# Patient Record
Sex: FEMALE | Race: WHITE | NOT HISPANIC OR LATINO | Employment: FULL TIME | URBAN - METROPOLITAN AREA
[De-identification: names, ages, dates, MRNs, and addresses within clinical notes are randomized per-mention and may not be internally consistent; named-entity substitution may affect disease eponyms.]

---

## 2017-06-14 ENCOUNTER — HISTORICAL (OUTPATIENT)
Dept: ADMINISTRATIVE | Facility: HOSPITAL | Age: 42
End: 2017-06-14

## 2019-11-08 ENCOUNTER — HISTORICAL (OUTPATIENT)
Dept: LAB | Facility: HOSPITAL | Age: 44
End: 2019-11-08

## 2019-11-08 LAB
ABS NEUT (OLG): 2.39 X10(3)/MCL (ref 2.1–9.2)
ALBUMIN SERPL-MCNC: 4.1 GM/DL (ref 3.4–5)
ALBUMIN/GLOB SERPL: 1 RATIO (ref 1–2)
ALP SERPL-CCNC: 56 UNIT/L (ref 45–117)
ALT SERPL-CCNC: 27 UNIT/L (ref 13–56)
AST SERPL-CCNC: 23 UNIT/L (ref 15–37)
BASOPHILS # BLD AUTO: 0.05 X10(3)/MCL (ref 0–0.2)
BASOPHILS NFR BLD AUTO: 0.9 % (ref 0–1)
BILIRUB SERPL-MCNC: 0.4 MG/DL (ref 0.2–1)
BILIRUBIN DIRECT+TOT PNL SERPL-MCNC: 0.12 MG/DL (ref 0–0.2)
BILIRUBIN DIRECT+TOT PNL SERPL-MCNC: 0.28 MG/DL (ref 0–1)
BUN SERPL-MCNC: 15 MG/DL (ref 7–18)
CALCIUM SERPL-MCNC: 9 MG/DL (ref 8.5–10.1)
CHLORIDE SERPL-SCNC: 105 MMOL/L (ref 98–107)
CHOLEST SERPL-MCNC: 224 MG/DL (ref 0–199)
CHOLEST/HDLC SERPL: 3 {RATIO}
CO2 SERPL-SCNC: 30 MMOL/L (ref 21–32)
CREAT SERPL-MCNC: 0.63 MG/DL (ref 0.55–1.02)
EOSINOPHIL # BLD AUTO: 0.18 X10(3)/MCL (ref 0–0.9)
EOSINOPHIL NFR BLD AUTO: 3.2 % (ref 0–6.4)
ERYTHROCYTE [DISTWIDTH] IN BLOOD BY AUTOMATED COUNT: 12.7 % (ref 11.5–17)
GLOBULIN SER-MCNC: 4 GM/DL (ref 2–4)
GLUCOSE SERPL-MCNC: 88 MG/DL (ref 74–106)
HCT VFR BLD AUTO: 39.1 % (ref 37–47)
HDLC SERPL-MCNC: 66 MG/DL
HGB BLD-MCNC: 13.3 GM/DL (ref 12–16)
IMM GRANULOCYTES # BLD AUTO: 0.01 10*3/UL (ref 0–0.02)
IMM GRANULOCYTES NFR BLD AUTO: 0.2 % (ref 0–0.43)
LDLC SERPL CALC-MCNC: 144 MG/DL (ref 0–129)
LYMPHOCYTES # BLD AUTO: 2.43 X10(3)/MCL (ref 0.6–4.6)
LYMPHOCYTES NFR BLD AUTO: 42.6 % (ref 16–44)
MCH RBC QN AUTO: 33.1 PG (ref 27–31)
MCHC RBC AUTO-ENTMCNC: 34 GM/DL (ref 33–36)
MCV RBC AUTO: 97.3 FL (ref 80–94)
MONOCYTES # BLD AUTO: 0.65 X10(3)/MCL (ref 0.1–1.3)
MONOCYTES NFR BLD AUTO: 11.4 % (ref 4–12.1)
NEUTROPHILS # BLD AUTO: 2.39 X10(3)/MCL (ref 2.1–9.2)
NEUTROPHILS NFR BLD AUTO: 41.7 % (ref 43–73)
NRBC BLD AUTO-RTO: 0 % (ref 0–0.2)
PLATELET # BLD AUTO: 207 X10(3)/MCL (ref 130–400)
PMV BLD AUTO: 11.2 FL (ref 7.4–10.4)
POTASSIUM SERPL-SCNC: 4 MMOL/L (ref 3.5–5.1)
PROT SERPL-MCNC: 7.9 GM/DL (ref 6.4–8.2)
RBC # BLD AUTO: 4.02 X10(6)/MCL (ref 4.2–5.4)
SODIUM SERPL-SCNC: 138 MMOL/L (ref 136–145)
TRIGL SERPL-MCNC: 68 MG/DL (ref 0–149)
VLDLC SERPL CALC-MCNC: 14 MG/DL
WBC # SPEC AUTO: 5.7 X10(3)/MCL (ref 4.5–11.5)

## 2020-08-03 ENCOUNTER — NURSE TRIAGE (OUTPATIENT)
Dept: ADMINISTRATIVE | Facility: CLINIC | Age: 45
End: 2020-08-03

## 2020-08-03 NOTE — TELEPHONE ENCOUNTER
Pt is en route to get rapid testing done elsewhere. Pt asked about accuracy of rapid testing. RN explained she did not have the information regarding accuracy of testing and pt should check with the provider of the test regarding this. Pt has no further questions or concerns. Advised to call back if she does.    Reason for Disposition   General information question, no triage required and triager able to answer question    Protocols used: INFORMATION ONLY CALL - NO TRIAGE-A-    Care Advice Given     Given By Given At Modified    Harpal Kendall RN 8/3/2020  8:43 AM No    HOME CARE - INFORMATION OR ADVICE ONLY CALL.    Harpal Kendall RN 8/3/2020  8:43 AM No    CALL BACK IF:    * You have more questions.    Harpal Kendall RN 8/3/2020  8:43 AM No    CARE ADVICE given per Information Only Call - No Triage (Adult) guideline.      Disposition     Information or Advice Only Call      Protocols (Most recently selected listed first)     Name Status    INFORMATION ONLY CALL - NO TRIAGE-A- Used    CORONAVIRUS (COVID-19) DIAGNOSED OR [id number] Viewed

## 2020-11-18 ENCOUNTER — HISTORICAL (OUTPATIENT)
Dept: LAB | Facility: HOSPITAL | Age: 45
End: 2020-11-18

## 2020-11-18 LAB
ABS NEUT (OLG): 3.21 X10(3)/MCL (ref 2.1–9.2)
ALBUMIN SERPL-MCNC: 4.1 GM/DL (ref 3.5–5)
ALBUMIN/GLOB SERPL: 1.2 RATIO (ref 1.1–2)
ALP SERPL-CCNC: 48 UNIT/L (ref 40–150)
ALT SERPL-CCNC: 18 UNIT/L (ref 0–55)
APPEARANCE, UA: ABNORMAL
AST SERPL-CCNC: 27 UNIT/L (ref 5–34)
BACTERIA SPEC CULT: ABNORMAL /HPF
BASOPHILS # BLD AUTO: 0.05 X10(3)/MCL (ref 0–0.2)
BASOPHILS NFR BLD AUTO: 0.8 % (ref 0–1)
BILIRUB SERPL-MCNC: 0.6 MG/DL (ref 0.2–1.2)
BILIRUB UR QL STRIP: NEGATIVE
BILIRUBIN DIRECT+TOT PNL SERPL-MCNC: 0.2 MG/DL (ref 0–0.5)
BILIRUBIN DIRECT+TOT PNL SERPL-MCNC: 0.4 MG/DL (ref 0–0.8)
BUN SERPL-MCNC: 11.1 MG/DL (ref 7–18.7)
CALCIUM SERPL-MCNC: 9.6 MG/DL (ref 8.4–10.2)
CHLORIDE SERPL-SCNC: 103 MMOL/L (ref 98–107)
CHOLEST SERPL-MCNC: 219 MG/DL
CHOLEST/HDLC SERPL: 3 {RATIO} (ref 0–5)
CO2 SERPL-SCNC: 28 MMOL/L (ref 22–29)
COLOR UR: ABNORMAL
CREAT SERPL-MCNC: 0.71 MG/DL (ref 0.57–1.11)
EOSINOPHIL # BLD AUTO: 0.23 X10(3)/MCL (ref 0–0.9)
EOSINOPHIL NFR BLD AUTO: 3.5 % (ref 0–6.4)
ERYTHROCYTE [DISTWIDTH] IN BLOOD BY AUTOMATED COUNT: 12.8 % (ref 11.5–17)
GLOBULIN SER-MCNC: 3.3 GM/DL (ref 2.4–3.5)
GLUCOSE (UA): NEGATIVE
GLUCOSE SERPL-MCNC: 94 MG/DL (ref 74–100)
HCT VFR BLD AUTO: 37.9 % (ref 37–47)
HDLC SERPL-MCNC: 66 MG/DL (ref 40–60)
HGB BLD-MCNC: 12.7 GM/DL (ref 12–16)
HGB UR QL STRIP: ABNORMAL
IMM GRANULOCYTES # BLD AUTO: 0.01 10*3/UL (ref 0–0.02)
IMM GRANULOCYTES NFR BLD AUTO: 0.2 % (ref 0–0.43)
KETONES UR QL STRIP: NEGATIVE
LDLC SERPL CALC-MCNC: 141 MG/DL (ref 50–140)
LEUKOCYTE ESTERASE UR QL STRIP: ABNORMAL
LYMPHOCYTES # BLD AUTO: 2.42 X10(3)/MCL (ref 0.6–4.6)
LYMPHOCYTES NFR BLD AUTO: 36.6 % (ref 16–44)
MCH RBC QN AUTO: 32 PG (ref 27–31)
MCHC RBC AUTO-ENTMCNC: 33.5 GM/DL (ref 33–36)
MCV RBC AUTO: 95.5 FL (ref 80–94)
MONOCYTES # BLD AUTO: 0.69 X10(3)/MCL (ref 0.1–1.3)
MONOCYTES NFR BLD AUTO: 10.4 % (ref 4–12.1)
NEUTROPHILS # BLD AUTO: 3.21 X10(3)/MCL (ref 2.1–9.2)
NEUTROPHILS NFR BLD AUTO: 48.5 % (ref 43–73)
NITRITE UR QL STRIP: NEGATIVE
NRBC BLD AUTO-RTO: 0 % (ref 0–0.2)
PH UR STRIP: 6 [PH] (ref 5–7)
PLATELET # BLD AUTO: 236 X10(3)/MCL (ref 130–400)
PMV BLD AUTO: 10.9 FL (ref 7.4–10.4)
POTASSIUM SERPL-SCNC: 4.6 MMOL/L (ref 3.5–5.1)
PROT SERPL-MCNC: 7.4 GM/DL (ref 6.4–8.3)
PROT UR QL STRIP: NEGATIVE
RBC # BLD AUTO: 3.97 X10(6)/MCL (ref 4.2–5.4)
RBC #/AREA URNS HPF: ABNORMAL /HPF
SODIUM SERPL-SCNC: 139 MMOL/L (ref 136–145)
SP GR UR STRIP: 1.01 (ref 1–1.03)
SQUAMOUS EPITHELIAL, UA: ABNORMAL /LPF
TRIGL SERPL-MCNC: 61 MG/DL (ref 0–150)
UROBILINOGEN UR STRIP-ACNC: NEGATIVE
VLDLC SERPL CALC-MCNC: 12 MG/DL
WBC # SPEC AUTO: 6.6 X10(3)/MCL (ref 4.5–11.5)
WBC #/AREA URNS HPF: ABNORMAL /HPF

## 2020-11-20 LAB — FINAL CULTURE: NORMAL

## 2021-10-04 ENCOUNTER — HISTORICAL (OUTPATIENT)
Dept: LAB | Facility: HOSPITAL | Age: 46
End: 2021-10-04

## 2021-10-04 LAB
ABS NEUT (OLG): 4.05 X10(3)/MCL (ref 2.1–9.2)
ANTINUCLEAR ANTIBODY SCREEN (OHS): NEGATIVE
BUN SERPL-MCNC: 12.5 MG/DL (ref 7–18.7)
CALCIUM SERPL-MCNC: 10.3 MG/DL (ref 8.4–10.2)
CENTROMERE PROTEIN ANTIBODY (OHS): NEGATIVE
CHLORIDE SERPL-SCNC: 101 MMOL/L (ref 98–107)
CO2 SERPL-SCNC: 27 MMOL/L (ref 22–29)
CREAT SERPL-MCNC: 0.73 MG/DL (ref 0.57–1.11)
CREAT/UREA NIT SERPL: 17
CRP SERPL HS-MCNC: 1 MG/L (ref 0–5)
DSDNA ANTIBODY (OHS): NEGATIVE
ERYTHROCYTE [DISTWIDTH] IN BLOOD BY AUTOMATED COUNT: 12.7 % (ref 11.5–17)
ERYTHROCYTE [SEDIMENTATION RATE] IN BLOOD: 8 MM/HR (ref 0–20)
GLUCOSE SERPL-MCNC: 91 MG/DL (ref 74–100)
HCT VFR BLD AUTO: 40.8 % (ref 37–47)
HGB BLD-MCNC: 13.5 GM/DL (ref 12–16)
JO-1 ANTIBODY (OHS): NEGATIVE
MCH RBC QN AUTO: 31.8 PG (ref 27–31)
MCHC RBC AUTO-ENTMCNC: 33.1 GM/DL (ref 33–36)
MCV RBC AUTO: 96.2 FL (ref 80–94)
NRBC BLD AUTO-RTO: 0 % (ref 0–0.2)
PLATELET # BLD AUTO: 246 X10(3)/MCL (ref 130–400)
PMV BLD AUTO: 11.6 FL (ref 7.4–10.4)
POTASSIUM SERPL-SCNC: 3.5 MMOL/L (ref 3.5–5.1)
RBC # BLD AUTO: 4.24 X10(6)/MCL (ref 4.2–5.4)
RHEUMATOID FACT SERPL-ACNC: <13 IU/ML
RNP70 ANTIBODY (OHS): NEGATIVE
SCLERODERMA (SCL-70S) ANTIBODY (OHS): NEGATIVE
SMITH DP IGG (OHS): NORMAL
SODIUM SERPL-SCNC: 138 MMOL/L (ref 136–145)
SSA(RO) ANTIBODY (OHS): NEGATIVE
SSB(LA) ANTIBODY (OHS): NEGATIVE
U1RNP ANTIBODY (OHS): NEGATIVE
WBC # SPEC AUTO: 7.4 X10(3)/MCL (ref 4.5–11.5)

## 2021-11-29 ENCOUNTER — HISTORICAL (OUTPATIENT)
Dept: LAB | Facility: HOSPITAL | Age: 46
End: 2021-11-29

## 2021-11-29 LAB
ABS NEUT (OLG): 2.13 X10(3)/MCL (ref 2.1–9.2)
ALBUMIN SERPL-MCNC: 4 GM/DL (ref 3.5–5)
ALBUMIN/GLOB SERPL: 1.3 RATIO (ref 1.1–2)
ALP SERPL-CCNC: 71 UNIT/L (ref 40–150)
ALT SERPL-CCNC: 15 UNIT/L (ref 0–55)
AST SERPL-CCNC: 27 UNIT/L (ref 5–34)
BASOPHILS # BLD AUTO: 0.07 X10(3)/MCL (ref 0–0.2)
BASOPHILS NFR BLD AUTO: 1.3 % (ref 0–1)
BILIRUB SERPL-MCNC: 0.7 MG/DL (ref 0.2–1.2)
BILIRUBIN DIRECT+TOT PNL SERPL-MCNC: 0.2 MG/DL (ref 0–0.5)
BILIRUBIN DIRECT+TOT PNL SERPL-MCNC: 0.5 MG/DL (ref 0–0.8)
BUN SERPL-MCNC: 11.9 MG/DL (ref 7–18.7)
CALCIUM SERPL-MCNC: 9.4 MG/DL (ref 8.4–10.2)
CHLORIDE SERPL-SCNC: 104 MMOL/L (ref 98–107)
CHOLEST SERPL-MCNC: 214 MG/DL
CHOLEST/HDLC SERPL: 3 {RATIO} (ref 0–5)
CO2 SERPL-SCNC: 28 MMOL/L (ref 22–29)
CREAT SERPL-MCNC: 0.68 MG/DL (ref 0.57–1.11)
EOSINOPHIL # BLD AUTO: 0.29 X10(3)/MCL (ref 0–0.9)
EOSINOPHIL NFR BLD AUTO: 5.3 % (ref 0–6.4)
ERYTHROCYTE [DISTWIDTH] IN BLOOD BY AUTOMATED COUNT: 13.1 % (ref 11.5–17)
GLOBULIN SER-MCNC: 3 GM/DL (ref 2.4–3.5)
GLUCOSE SERPL-MCNC: 101 MG/DL (ref 74–100)
HCT VFR BLD AUTO: 38.3 % (ref 37–47)
HDLC SERPL-MCNC: 66 MG/DL (ref 40–60)
HGB BLD-MCNC: 12.8 GM/DL (ref 12–16)
IMM GRANULOCYTES # BLD AUTO: 0 10*3/UL (ref 0–0.02)
IMM GRANULOCYTES NFR BLD AUTO: 0 % (ref 0–0.43)
LDLC SERPL CALC-MCNC: 134 MG/DL (ref 50–140)
LYMPHOCYTES # BLD AUTO: 2.47 X10(3)/MCL (ref 0.6–4.6)
LYMPHOCYTES NFR BLD AUTO: 44.7 % (ref 16–44)
MCH RBC QN AUTO: 32.9 PG (ref 27–31)
MCHC RBC AUTO-ENTMCNC: 33.4 GM/DL (ref 33–36)
MCV RBC AUTO: 98.5 FL (ref 80–94)
MONOCYTES # BLD AUTO: 0.56 X10(3)/MCL (ref 0.1–1.3)
MONOCYTES NFR BLD AUTO: 10.1 % (ref 4–12.1)
NEUTROPHILS # BLD AUTO: 2.13 X10(3)/MCL (ref 2.1–9.2)
NEUTROPHILS NFR BLD AUTO: 38.6 % (ref 43–73)
NRBC BLD AUTO-RTO: 0 % (ref 0–0.2)
PLATELET # BLD AUTO: 250 X10(3)/MCL (ref 130–400)
PMV BLD AUTO: 10.8 FL (ref 7.4–10.4)
POTASSIUM SERPL-SCNC: 4.1 MMOL/L (ref 3.5–5.1)
PROT SERPL-MCNC: 7 GM/DL (ref 6.4–8.3)
RBC # BLD AUTO: 3.89 X10(6)/MCL (ref 4.2–5.4)
SODIUM SERPL-SCNC: 139 MMOL/L (ref 136–145)
TRIGL SERPL-MCNC: 69 MG/DL (ref 0–150)
VLDLC SERPL CALC-MCNC: 14 MG/DL
WBC # SPEC AUTO: 5.5 X10(3)/MCL (ref 4.5–11.5)

## 2022-04-10 ENCOUNTER — HISTORICAL (OUTPATIENT)
Dept: ADMINISTRATIVE | Facility: HOSPITAL | Age: 47
End: 2022-04-10

## 2022-04-28 VITALS
OXYGEN SATURATION: 99 % | WEIGHT: 138.88 LBS | DIASTOLIC BLOOD PRESSURE: 73 MMHG | BODY MASS INDEX: 21.8 KG/M2 | HEIGHT: 67 IN | SYSTOLIC BLOOD PRESSURE: 114 MMHG

## 2022-11-16 ENCOUNTER — TELEPHONE (OUTPATIENT)
Dept: FAMILY MEDICINE | Facility: CLINIC | Age: 47
End: 2022-11-16
Payer: COMMERCIAL

## 2022-11-16 DIAGNOSIS — Z00.00 WELLNESS EXAMINATION: Primary | ICD-10-CM

## 2022-11-22 ENCOUNTER — LAB VISIT (OUTPATIENT)
Dept: LAB | Facility: HOSPITAL | Age: 47
End: 2022-11-22
Attending: NURSE PRACTITIONER
Payer: COMMERCIAL

## 2022-11-22 DIAGNOSIS — Z00.00 WELLNESS EXAMINATION: ICD-10-CM

## 2022-11-22 LAB
ALBUMIN SERPL-MCNC: 4.1 GM/DL (ref 3.5–5)
ALBUMIN/GLOB SERPL: 1.2 RATIO (ref 1.1–2)
ALP SERPL-CCNC: 70 UNIT/L (ref 40–150)
ALT SERPL-CCNC: 17 UNIT/L (ref 0–55)
AST SERPL-CCNC: 25 UNIT/L (ref 5–34)
BASOPHILS # BLD AUTO: 0.07 X10(3)/MCL (ref 0–0.2)
BASOPHILS NFR BLD AUTO: 1.5 %
BILIRUBIN DIRECT+TOT PNL SERPL-MCNC: 0.8 MG/DL
BUN SERPL-MCNC: 11.4 MG/DL (ref 7–18.7)
CALCIUM SERPL-MCNC: 10.1 MG/DL (ref 8.4–10.2)
CHLORIDE SERPL-SCNC: 104 MMOL/L (ref 98–107)
CHOLEST SERPL-MCNC: 212 MG/DL
CHOLEST/HDLC SERPL: 3 {RATIO} (ref 0–5)
CO2 SERPL-SCNC: 30 MMOL/L (ref 22–29)
CREAT SERPL-MCNC: 0.71 MG/DL (ref 0.55–1.02)
EOSINOPHIL # BLD AUTO: 0.15 X10(3)/MCL (ref 0–0.9)
EOSINOPHIL NFR BLD AUTO: 3.3 %
ERYTHROCYTE [DISTWIDTH] IN BLOOD BY AUTOMATED COUNT: 13.5 % (ref 11.5–17)
EST. AVERAGE GLUCOSE BLD GHB EST-MCNC: 99.7 MG/DL
GFR SERPLBLD CREATININE-BSD FMLA CKD-EPI: >60 MLS/MIN/1.73/M2
GLOBULIN SER-MCNC: 3.3 GM/DL (ref 2.4–3.5)
GLUCOSE SERPL-MCNC: 98 MG/DL (ref 74–100)
HBA1C MFR BLD: 5.1 %
HCT VFR BLD AUTO: 40.2 % (ref 37–47)
HCV AB SERPL QL IA: NONREACTIVE
HDLC SERPL-MCNC: 64 MG/DL (ref 35–60)
HGB BLD-MCNC: 13.4 GM/DL (ref 12–16)
HIV 1+2 AB+HIV1 P24 AG SERPL QL IA: NONREACTIVE
IMM GRANULOCYTES # BLD AUTO: 0.01 X10(3)/MCL (ref 0–0.04)
IMM GRANULOCYTES NFR BLD AUTO: 0.2 %
LDLC SERPL CALC-MCNC: 132 MG/DL (ref 50–140)
LYMPHOCYTES # BLD AUTO: 1.99 X10(3)/MCL (ref 0.6–4.6)
LYMPHOCYTES NFR BLD AUTO: 43.2 %
MCH RBC QN AUTO: 32.5 PG (ref 27–31)
MCHC RBC AUTO-ENTMCNC: 33.3 MG/DL (ref 33–36)
MCV RBC AUTO: 97.6 FL (ref 80–94)
MONOCYTES # BLD AUTO: 0.51 X10(3)/MCL (ref 0.1–1.3)
MONOCYTES NFR BLD AUTO: 11.1 %
NEUTROPHILS # BLD AUTO: 1.9 X10(3)/MCL (ref 2.1–9.2)
NEUTROPHILS NFR BLD AUTO: 40.7 %
NRBC BLD AUTO-RTO: 0 %
PLATELET # BLD AUTO: 246 X10(3)/MCL (ref 130–400)
PMV BLD AUTO: 11.2 FL (ref 7.4–10.4)
POTASSIUM SERPL-SCNC: 4.4 MMOL/L (ref 3.5–5.1)
PROT SERPL-MCNC: 7.4 GM/DL (ref 6.4–8.3)
RBC # BLD AUTO: 4.12 X10(6)/MCL (ref 4.2–5.4)
SODIUM SERPL-SCNC: 142 MMOL/L (ref 136–145)
TRIGL SERPL-MCNC: 78 MG/DL (ref 37–140)
TSH SERPL-ACNC: 0.93 UIU/ML (ref 0.35–4.94)
VLDLC SERPL CALC-MCNC: 16 MG/DL
WBC # SPEC AUTO: 4.6 X10(3)/MCL (ref 4.5–11.5)

## 2022-11-22 PROCEDURE — 80061 LIPID PANEL: CPT

## 2022-11-22 PROCEDURE — 86803 HEPATITIS C AB TEST: CPT

## 2022-11-22 PROCEDURE — 85025 COMPLETE CBC W/AUTO DIFF WBC: CPT

## 2022-11-22 PROCEDURE — 84443 ASSAY THYROID STIM HORMONE: CPT

## 2022-11-22 PROCEDURE — 80053 COMPREHEN METABOLIC PANEL: CPT

## 2022-11-22 PROCEDURE — 83036 HEMOGLOBIN GLYCOSYLATED A1C: CPT

## 2022-11-22 PROCEDURE — 87389 HIV-1 AG W/HIV-1&-2 AB AG IA: CPT

## 2022-11-22 PROCEDURE — 36415 COLL VENOUS BLD VENIPUNCTURE: CPT

## 2022-12-05 ENCOUNTER — OFFICE VISIT (OUTPATIENT)
Dept: FAMILY MEDICINE | Facility: CLINIC | Age: 47
End: 2022-12-05
Payer: COMMERCIAL

## 2022-12-05 VITALS
DIASTOLIC BLOOD PRESSURE: 68 MMHG | SYSTOLIC BLOOD PRESSURE: 98 MMHG | OXYGEN SATURATION: 98 % | HEART RATE: 58 BPM | WEIGHT: 140.5 LBS | HEIGHT: 66 IN | RESPIRATION RATE: 18 BRPM | TEMPERATURE: 99 F | BODY MASS INDEX: 22.58 KG/M2

## 2022-12-05 DIAGNOSIS — Z00.00 WELLNESS EXAMINATION: Primary | ICD-10-CM

## 2022-12-05 DIAGNOSIS — R82.71 BACTERIURIA: ICD-10-CM

## 2022-12-05 DIAGNOSIS — Z79.890 HORMONE REPLACEMENT THERAPY: ICD-10-CM

## 2022-12-05 DIAGNOSIS — Z23 NEED FOR TETANUS BOOSTER: ICD-10-CM

## 2022-12-05 DIAGNOSIS — Z12.11 COLON CANCER SCREENING: ICD-10-CM

## 2022-12-05 DIAGNOSIS — E78.00 HYPERCHOLESTEROLEMIA: ICD-10-CM

## 2022-12-05 PROCEDURE — 90471 TDAP VACCINE GREATER THAN OR EQUAL TO 7YO IM: ICD-10-PCS | Mod: ,,, | Performed by: NURSE PRACTITIONER

## 2022-12-05 PROCEDURE — 3074F SYST BP LT 130 MM HG: CPT | Mod: CPTII,,, | Performed by: NURSE PRACTITIONER

## 2022-12-05 PROCEDURE — 90715 TDAP VACCINE 7 YRS/> IM: CPT | Mod: ,,, | Performed by: NURSE PRACTITIONER

## 2022-12-05 PROCEDURE — 1159F MED LIST DOCD IN RCRD: CPT | Mod: CPTII,,, | Performed by: NURSE PRACTITIONER

## 2022-12-05 PROCEDURE — 3008F BODY MASS INDEX DOCD: CPT | Mod: CPTII,,, | Performed by: NURSE PRACTITIONER

## 2022-12-05 PROCEDURE — 3078F PR MOST RECENT DIASTOLIC BLOOD PRESSURE < 80 MM HG: ICD-10-PCS | Mod: CPTII,,, | Performed by: NURSE PRACTITIONER

## 2022-12-05 PROCEDURE — 3074F PR MOST RECENT SYSTOLIC BLOOD PRESSURE < 130 MM HG: ICD-10-PCS | Mod: CPTII,,, | Performed by: NURSE PRACTITIONER

## 2022-12-05 PROCEDURE — 1159F PR MEDICATION LIST DOCUMENTED IN MEDICAL RECORD: ICD-10-PCS | Mod: CPTII,,, | Performed by: NURSE PRACTITIONER

## 2022-12-05 PROCEDURE — 3078F DIAST BP <80 MM HG: CPT | Mod: CPTII,,, | Performed by: NURSE PRACTITIONER

## 2022-12-05 PROCEDURE — 99396 PREV VISIT EST AGE 40-64: CPT | Mod: 25,,, | Performed by: NURSE PRACTITIONER

## 2022-12-05 PROCEDURE — 3008F PR BODY MASS INDEX (BMI) DOCUMENTED: ICD-10-PCS | Mod: CPTII,,, | Performed by: NURSE PRACTITIONER

## 2022-12-05 PROCEDURE — 90715 TDAP VACCINE GREATER THAN OR EQUAL TO 7YO IM: ICD-10-PCS | Mod: ,,, | Performed by: NURSE PRACTITIONER

## 2022-12-05 PROCEDURE — 99396 PR PREVENTIVE VISIT,EST,40-64: ICD-10-PCS | Mod: 25,,, | Performed by: NURSE PRACTITIONER

## 2022-12-05 PROCEDURE — 90471 IMMUNIZATION ADMIN: CPT | Mod: ,,, | Performed by: NURSE PRACTITIONER

## 2022-12-05 RX ORDER — AZELASTINE 1 MG/ML
SPRAY, METERED NASAL
COMMUNITY
Start: 2022-09-03

## 2022-12-05 RX ORDER — ESTRADIOL AND NORETHINDRONE ACETATE 1; .5 MG/1; MG/1
1 TABLET ORAL DAILY
COMMUNITY
End: 2023-09-07 | Stop reason: ALTCHOICE

## 2022-12-05 NOTE — PROGRESS NOTES
Subjective:      Patient ID: Hazel Bee is a 47 y.o. White female      Chief Complaint: Annual Exam (With completed labs. /Would like to discuss estradiol. )       Past Medical History:   Diagnosis Date    Hypercholesterolemia 12/5/2022        HPI  Presents to clinic for Annual Wellness exam. Denies any acute problems.     Labs previously done and reviewed with patient  PAP up to date; repeat due 2024  MMG due and scheduled at [facility] 1/2023  Cologuard Ordered  Tdap due; given today in office  Covid-19 (2021); Booster due        Review of Systems   Constitutional: Negative.  Negative for appetite change, chills and fever.   HENT: Negative.     Eyes: Negative.  Negative for discharge and redness.   Respiratory: Negative.  Negative for shortness of breath.    Cardiovascular: Negative.  Negative for chest pain.   Gastrointestinal: Negative.    Endocrine: Negative.    Genitourinary: Negative.    Integumentary:  Negative.   Allergic/Immunologic: Negative.    Neurological: Negative.    Psychiatric/Behavioral: Negative.     All other systems reviewed and are negative.       Objective:      Vitals:    12/05/22 1347   BP: 98/68   Pulse: (!) 58   Resp: 18   Temp: 99.3 °F (37.4 °C)      Body mass index is 22.68 kg/m².     Physical Exam  Vitals reviewed.   Constitutional:       Appearance: Normal appearance.   HENT:      Head: Normocephalic.      Mouth/Throat:      Mouth: Mucous membranes are moist.   Eyes:      Conjunctiva/sclera: Conjunctivae normal.      Pupils: Pupils are equal, round, and reactive to light.   Cardiovascular:      Rate and Rhythm: Normal rate and regular rhythm.   Pulmonary:      Effort: Pulmonary effort is normal. No respiratory distress.      Breath sounds: Normal breath sounds.   Abdominal:      General: Bowel sounds are normal.   Musculoskeletal:         General: Normal range of motion.      Cervical back: Normal range of motion and neck supple.   Skin:     General: Skin is warm and dry.    Neurological:      Mental Status: She is alert and oriented to person, place, and time.   Psychiatric:         Mood and Affect: Mood normal.          Current Outpatient Medications:     azelastine (ASTELIN) 137 mcg (0.1 %) nasal spray, as needed., Disp: , Rfl:     estradiol-norethindrone (ACTIVELLA) 1-0.5 mg per tablet, Take 1 tablet by mouth once daily., Disp: , Rfl:     Assessment & Plan:     Problem List Items Addressed This Visit          Cardiac/Vascular    Hypercholesterolemia     The 10-year ASCVD risk score (Manoj ENRIQUEZ, et al., 2019) is: 0.5%    Values used to calculate the score:      Age: 47 years      Sex: Female      Is Non- : No      Diabetic: No      Tobacco smoker: No      Systolic Blood Pressure: 98 mmHg      Is BP treated: No      HDL Cholesterol: 64 mg/dL      Total Cholesterol: 212 mg/dL      No Statin Indicated at this time            Renal/    Bacteriuria     Asymptomatic; no treatment indicated at present            ID    Need for tetanus booster     Tdap today         Relevant Orders    (In Office Administered) Tdap Vaccine       Endocrine    Hormone replacement therapy     Stable  Continue medication            Other    Wellness examination - Primary     Labs previously done and reviewed with patient  PAP up to date; repeat due 2024  MMG due and scheduled at [facility] 1/2023  Cologuard Ordered  Tdap due; given today in office  Covid-19 (2021); Booster due              Other Visit Diagnoses       Colon cancer screening        Relevant Orders    Cologuard Screening (Multitarget Stool DNA)

## 2022-12-05 NOTE — ASSESSMENT & PLAN NOTE
The 10-year ASCVD risk score (Manoj ENRIQUEZ, et al., 2019) is: 0.5%    Values used to calculate the score:      Age: 47 years      Sex: Female      Is Non- : No      Diabetic: No      Tobacco smoker: No      Systolic Blood Pressure: 98 mmHg      Is BP treated: No      HDL Cholesterol: 64 mg/dL      Total Cholesterol: 212 mg/dL      No Statin Indicated at this time

## 2022-12-05 NOTE — ASSESSMENT & PLAN NOTE
Labs previously done and reviewed with patient  PAP up to date; repeat due 2024  MMG due and scheduled at [facility] 1/2023  Cologuard Ordered  Tdap due; given today in office  Covid-19 (2021); Booster due

## 2023-01-04 LAB — BCS RECOMMENDATION EXT: NORMAL

## 2023-01-06 LAB — NONINV COLON CA DNA+OCC BLD SCRN STL QL: NEGATIVE

## 2023-01-12 ENCOUNTER — DOCUMENTATION ONLY (OUTPATIENT)
Dept: FAMILY MEDICINE | Facility: CLINIC | Age: 48
End: 2023-01-12
Payer: COMMERCIAL

## 2023-03-06 PROBLEM — Z00.00 WELLNESS EXAMINATION: Status: RESOLVED | Noted: 2022-12-05 | Resolved: 2023-03-06

## 2023-09-07 ENCOUNTER — TELEPHONE (OUTPATIENT)
Dept: FAMILY MEDICINE | Facility: CLINIC | Age: 48
End: 2023-09-07
Payer: COMMERCIAL

## 2023-09-07 ENCOUNTER — OFFICE VISIT (OUTPATIENT)
Dept: URGENT CARE | Facility: CLINIC | Age: 48
End: 2023-09-07
Payer: COMMERCIAL

## 2023-09-07 VITALS
WEIGHT: 143 LBS | OXYGEN SATURATION: 99 % | HEIGHT: 66 IN | TEMPERATURE: 99 F | SYSTOLIC BLOOD PRESSURE: 112 MMHG | RESPIRATION RATE: 18 BRPM | DIASTOLIC BLOOD PRESSURE: 75 MMHG | BODY MASS INDEX: 22.98 KG/M2 | HEART RATE: 62 BPM

## 2023-09-07 DIAGNOSIS — N89.8 VAGINAL DISCHARGE: Primary | ICD-10-CM

## 2023-09-07 DIAGNOSIS — N89.8 VAGINAL IRRITATION: ICD-10-CM

## 2023-09-07 LAB
BILIRUB UR QL STRIP: NEGATIVE
CLUE CELLS VAG QL WET PREP: ABNORMAL
GLUCOSE UR QL STRIP: NEGATIVE
KETONES UR QL STRIP: NEGATIVE
LEUKOCYTE ESTERASE UR QL STRIP: POSITIVE
PH, POC UA: 7
POC BLOOD, URINE: POSITIVE
POC NITRATES, URINE: NEGATIVE
PROT UR QL STRIP: NEGATIVE
SP GR UR STRIP: 1.01 (ref 1–1.03)
T VAGINALIS VAG QL WET PREP: ABNORMAL
UROBILINOGEN UR STRIP-ACNC: ABNORMAL (ref 0.1–1.1)
WBC #/AREA VAG WET PREP: ABNORMAL
YEAST SPEC QL WET PREP: ABNORMAL

## 2023-09-07 PROCEDURE — 99213 PR OFFICE/OUTPT VISIT, EST, LEVL III, 20-29 MIN: ICD-10-PCS | Mod: S$PBB,,, | Performed by: STUDENT IN AN ORGANIZED HEALTH CARE EDUCATION/TRAINING PROGRAM

## 2023-09-07 PROCEDURE — 87210 SMEAR WET MOUNT SALINE/INK: CPT | Performed by: STUDENT IN AN ORGANIZED HEALTH CARE EDUCATION/TRAINING PROGRAM

## 2023-09-07 PROCEDURE — 99213 OFFICE O/P EST LOW 20 MIN: CPT | Mod: S$PBB,,, | Performed by: STUDENT IN AN ORGANIZED HEALTH CARE EDUCATION/TRAINING PROGRAM

## 2023-09-07 PROCEDURE — 99213 OFFICE O/P EST LOW 20 MIN: CPT | Mod: PBBFAC | Performed by: STUDENT IN AN ORGANIZED HEALTH CARE EDUCATION/TRAINING PROGRAM

## 2023-09-07 PROCEDURE — 81003 URINALYSIS AUTO W/O SCOPE: CPT | Mod: PBBFAC | Performed by: STUDENT IN AN ORGANIZED HEALTH CARE EDUCATION/TRAINING PROGRAM

## 2023-09-07 RX ORDER — ESTRADIOL 0.1 MG/G
CREAM VAGINAL
COMMUNITY
Start: 2023-07-09 | End: 2023-12-06 | Stop reason: ALTCHOICE

## 2023-09-07 RX ORDER — MICONAZOLE NITRATE 200 MG/1
200 SUPPOSITORY VAGINAL NIGHTLY
Qty: 3 SUPPOSITORY | Refills: 0 | Status: SHIPPED | OUTPATIENT
Start: 2023-09-07 | End: 2023-12-06 | Stop reason: ALTCHOICE

## 2023-09-07 RX ORDER — PROGESTERONE 200 MG/1
200 CAPSULE ORAL NIGHTLY
COMMUNITY
Start: 2023-07-20

## 2023-09-07 NOTE — TELEPHONE ENCOUNTER
----- Message from Guerda Clinton LPN sent at 9/7/2023  9:00 AM CDT -----  Regarding: FW: med advice  Please call pt  Help her get scheduled for visit  ----- Message -----  From: Danielle Garcia  Sent: 9/7/2023   8:42 AM CDT  To: Trevor Alexander Staff  Subject: med advice                                       .Type:  Needs Medical Advice    Who Called: Pt  Symptoms (please be specific):    How long has patient had these symptoms:    Pharmacy name and phone #: [id number] [place] - [address]  Would the patient rather a call back or a response via [facility]? Call back  Best Call Back Number: [phone]  Additional Information: Pt requesting meds for a yeast infection, pt does rather cream and not pills, refused appt.

## 2023-09-07 NOTE — PROGRESS NOTES
"Subjective:      Patient ID: Hazel Bee is a 47 y.o. female.    Vitals:  height is 5' 6" (1.676 m) and weight is 64.9 kg (143 lb). Her oral temperature is 98.6 °F (37 °C). Her blood pressure is 112/75 and her pulse is 62. Her respiration is 18 and oxygen saturation is 99%.     Chief Complaint: Vaginal Pain (Burning and discharge x 2 days. Denies urinary issues.)    Vaginal Pain  The patient's primary symptoms include vaginal discharge. Pertinent negatives include no abdominal pain, chills, constipation, diarrhea, dysuria, fever, flank pain, frequency, nausea, rash, urgency or vomiting.     Hazel Bee is a 47-year-old female presenting to the urgent care clinic today with vaginal discharge and irritation that has been going on for the past 2 days.  She denies any hematuria, dysuria, burning with urination.  Patient states that his symptoms feel similar to prior times when she has had a yeast infection.  She denies any malodorous vaginal discharge.    Constitution: Negative for chills and fever.   HENT:  Negative for ear pain, congestion, sinus pain and sinus pressure.    Neck: Negative for neck pain and neck swelling.   Cardiovascular:  Negative for chest pain and sob on exertion.   Eyes:  Negative for eye pain.   Respiratory:  Negative for shortness of breath and wheezing.    Gastrointestinal:  Negative for abdominal pain, nausea, vomiting, constipation and diarrhea.   Genitourinary:  Positive for vaginal pain and vaginal discharge. Negative for dysuria, frequency, urgency and flank pain.   Musculoskeletal:  Negative for pain.   Skin:  Negative for rash.   Neurological:  Negative for dizziness, numbness, tingling and seizures.      Objective:     Physical Exam   Constitutional: She is oriented to person, place, and time. She appears well-developed.   HENT:   Head: Normocephalic and atraumatic.   Ears:   Right Ear: External ear normal.   Left Ear: External ear normal.   Nose: Nose normal.   Mouth/Throat: Mucous " membranes are normal.   Eyes: Conjunctivae and lids are normal.   Neck: Trachea normal. Neck supple.   Cardiovascular: Normal rate, regular rhythm and normal heart sounds.   Pulmonary/Chest: Effort normal and breath sounds normal. No respiratory distress.   Abdominal: Normal appearance and bowel sounds are normal. She exhibits no distension and no mass. Soft. There is no abdominal tenderness.   Genitourinary:         Comments: Deferred     Musculoskeletal: Normal range of motion.         General: Normal range of motion.   Neurological: She is alert and oriented to person, place, and time. She has normal strength.   Skin: Skin is warm, dry, intact, not diaphoretic and not pale.   Psychiatric: Her speech is normal and behavior is normal. Judgment and thought content normal.   Nursing note and vitals reviewed.      Assessment:     1. Vaginal discharge    2. Vaginal irritation        Plan:     Patient presents to the urgent care clinic today general discharge for the past 2 days, that is very similar to prior times when she has had a yeast infection.  We will send in a prescription for fluconazole suppositories per her preference and we will send in a wet prep to confirm.  Recommended follow-up with her gynecologist.    Vaginal discharge  -     miconazole nitrate (MICONAZOLE-3) 200 mg Supp; Place 1 suppository (200 mg total) vaginally every evening.  Dispense: 3 suppository; Refill: 0    Vaginal irritation  -     POCT Urinalysis, Dipstick, Automated, W/O Scope  -     Wet Prep, Genital      This note is dictated using the M*Modal Fluency Direct word recognition program. There are word recognition mistakes that are occasionally missed on review.    Floresita Campos MD

## 2023-09-09 ENCOUNTER — TELEPHONE (OUTPATIENT)
Dept: URGENT CARE | Facility: CLINIC | Age: 48
End: 2023-09-09
Payer: COMMERCIAL

## 2023-09-09 DIAGNOSIS — B37.31 VULVOVAGINAL CANDIDIASIS: Primary | ICD-10-CM

## 2023-09-09 RX ORDER — FLUCONAZOLE 150 MG/1
TABLET ORAL
Qty: 2 TABLET | Refills: 0 | Status: SHIPPED | OUTPATIENT
Start: 2023-09-09 | End: 2023-12-06 | Stop reason: ALTCHOICE

## 2023-09-09 NOTE — TELEPHONE ENCOUNTER
----- Message from Melisa Lockhart sent at 9/9/2023  6:25 PM CDT -----  Regarding: medication  Patient was prescribed, Miconazole, for a yeast infection on 09/07.  [facility] and patient would like to know if she could be prescribed the pill version        Thank you!

## 2023-10-31 ENCOUNTER — OFFICE VISIT (OUTPATIENT)
Dept: URGENT CARE | Facility: CLINIC | Age: 48
End: 2023-10-31
Payer: COMMERCIAL

## 2023-10-31 VITALS
BODY MASS INDEX: 22.82 KG/M2 | HEIGHT: 66 IN | HEART RATE: 58 BPM | TEMPERATURE: 98 F | OXYGEN SATURATION: 99 % | DIASTOLIC BLOOD PRESSURE: 67 MMHG | SYSTOLIC BLOOD PRESSURE: 114 MMHG | RESPIRATION RATE: 16 BRPM | WEIGHT: 142 LBS

## 2023-10-31 DIAGNOSIS — R09.81 SINUS CONGESTION: ICD-10-CM

## 2023-10-31 DIAGNOSIS — R09.82 POSTNASAL DRIP: Primary | ICD-10-CM

## 2023-10-31 LAB
CTP QC/QA: YES
MOLECULAR STREP A: NEGATIVE

## 2023-10-31 PROCEDURE — 99213 PR OFFICE/OUTPT VISIT, EST, LEVL III, 20-29 MIN: ICD-10-PCS | Mod: S$PBB,,, | Performed by: STUDENT IN AN ORGANIZED HEALTH CARE EDUCATION/TRAINING PROGRAM

## 2023-10-31 PROCEDURE — 99214 OFFICE O/P EST MOD 30 MIN: CPT | Mod: PBBFAC | Performed by: STUDENT IN AN ORGANIZED HEALTH CARE EDUCATION/TRAINING PROGRAM

## 2023-10-31 PROCEDURE — 87651 STREP A DNA AMP PROBE: CPT | Mod: PBBFAC | Performed by: STUDENT IN AN ORGANIZED HEALTH CARE EDUCATION/TRAINING PROGRAM

## 2023-10-31 PROCEDURE — 99213 OFFICE O/P EST LOW 20 MIN: CPT | Mod: S$PBB,,, | Performed by: STUDENT IN AN ORGANIZED HEALTH CARE EDUCATION/TRAINING PROGRAM

## 2023-10-31 RX ORDER — FLUTICASONE PROPIONATE 50 MCG
2 SPRAY, SUSPENSION (ML) NASAL DAILY
Qty: 16 G | Refills: 0 | Status: SHIPPED | OUTPATIENT
Start: 2023-10-31

## 2023-10-31 NOTE — PROGRESS NOTES
"Subjective:      Patient ID: Hazel Bee is a 48 y.o. female.    Vitals:  height is 5' 6" (1.676 m) and weight is 64.4 kg (142 lb). Her oral temperature is 98.3 °F (36.8 °C). Her blood pressure is 114/67 and her pulse is 58 (abnormal). Her respiration is 16 and oxygen saturation is 99%.     Chief Complaint: Sinus Problem (Sinus pressure and nasal congestion x 1 month. Left ear pain x 1 week.)    Sinus Problem      Hazel Bee is a 48-year-old female presenting to the urgent care clinic today with sinus congestion, rhinorrhea, throat itching/throat pain, left ear pain.  Patient states that symptoms of nasal congestion and rhinorrhea have been going on for the past 1 month and left ear pain has been going on for the past 1 week.  Patient has not tried any nasal sprays.  She denies any fevers or chills.  Denies any headaches.  She denies any neurological symptoms.  ROS   Objective:     Physical Exam   Constitutional: She is oriented to person, place, and time. She appears well-developed. She is cooperative.  Non-toxic appearance. She does not appear ill. No distress.   HENT:   Head: Normocephalic and atraumatic.   Ears:   Right Ear: Hearing and external ear normal. impacted cerumen  Left Ear: Hearing, tympanic membrane, external ear and ear canal normal.   Nose: Congestion present. No mucosal edema, rhinorrhea or nasal deformity. No epistaxis. Right sinus exhibits no maxillary sinus tenderness and no frontal sinus tenderness. Left sinus exhibits no maxillary sinus tenderness and no frontal sinus tenderness.   Mouth/Throat: Uvula is midline, oropharynx is clear and moist and mucous membranes are normal. No trismus in the jaw. Normal dentition. No uvula swelling. No oropharyngeal exudate, posterior oropharyngeal edema or posterior oropharyngeal erythema.      Comments: Pharyngeal cobblestoning present  Eyes: Conjunctivae and lids are normal. No scleral icterus.   Neck: Trachea normal and phonation normal. Neck " supple. No edema present. No erythema present. No neck rigidity present.   Cardiovascular: Normal rate, regular rhythm, normal heart sounds and normal pulses.   Pulmonary/Chest: Effort normal and breath sounds normal. No respiratory distress. She has no decreased breath sounds. She has no rhonchi.   Abdominal: Normal appearance.   Musculoskeletal: Normal range of motion.         General: No deformity. Normal range of motion.   Neurological: She is alert and oriented to person, place, and time. She exhibits normal muscle tone. Coordination normal.   Skin: Skin is warm, dry, intact, not diaphoretic and not pale.   Psychiatric: Her speech is normal and behavior is normal. Judgment and thought content normal.   Nursing note and vitals reviewed.      Assessment:     1. Postnasal drip    2. Sinus congestion        Plan:   Patient presents to the urgent care clinic today with congestion, rhinorrhea, itchy/sore throat that has been going on for the past 1 month.  Physical exam as noted above.  We will treat patient for symptomatic postnasal drip.  Return to clinic precautions provided the patient.    Postnasal drip  -     fluticasone propionate (FLONASE) 50 mcg/actuation nasal spray; 2 sprays (100 mcg total) by Each Nostril route once daily.  Dispense: 16 g; Refill: 0    Sinus congestion  -     POCT Strep A, Molecular      This note is dictated using the M*Modal Fluency Direct word recognition program. There are word recognition mistakes that are occasionally missed on review.    Floresita Campos MD

## 2023-11-14 ENCOUNTER — OFFICE VISIT (OUTPATIENT)
Dept: URGENT CARE | Facility: CLINIC | Age: 48
End: 2023-11-14
Payer: COMMERCIAL

## 2023-11-14 VITALS
HEART RATE: 68 BPM | RESPIRATION RATE: 16 BRPM | OXYGEN SATURATION: 99 % | WEIGHT: 142 LBS | SYSTOLIC BLOOD PRESSURE: 104 MMHG | HEIGHT: 66 IN | TEMPERATURE: 99 F | BODY MASS INDEX: 22.82 KG/M2 | DIASTOLIC BLOOD PRESSURE: 70 MMHG

## 2023-11-14 DIAGNOSIS — J02.9 SORE THROAT: ICD-10-CM

## 2023-11-14 DIAGNOSIS — J02.0 STREP PHARYNGITIS: Primary | ICD-10-CM

## 2023-11-14 LAB
CTP QC/QA: YES
MOLECULAR STREP A: POSITIVE
POC MOLECULAR INFLUENZA A AGN: NEGATIVE
POC MOLECULAR INFLUENZA B AGN: NEGATIVE
SARS-COV-2 RDRP RESP QL NAA+PROBE: NEGATIVE

## 2023-11-14 PROCEDURE — 87651 POCT STREP A MOLECULAR: ICD-10-PCS | Mod: QW,,, | Performed by: FAMILY MEDICINE

## 2023-11-14 PROCEDURE — 87502 POCT INFLUENZA A/B MOLECULAR: ICD-10-PCS | Mod: QW,,, | Performed by: FAMILY MEDICINE

## 2023-11-14 PROCEDURE — 87635: ICD-10-PCS | Mod: QW,,, | Performed by: FAMILY MEDICINE

## 2023-11-14 PROCEDURE — 99214 OFFICE O/P EST MOD 30 MIN: CPT | Mod: ,,, | Performed by: FAMILY MEDICINE

## 2023-11-14 PROCEDURE — 87502 INFLUENZA DNA AMP PROBE: CPT | Mod: QW,,, | Performed by: FAMILY MEDICINE

## 2023-11-14 PROCEDURE — 99214 PR OFFICE/OUTPT VISIT, EST, LEVL IV, 30-39 MIN: ICD-10-PCS | Mod: ,,, | Performed by: FAMILY MEDICINE

## 2023-11-14 PROCEDURE — 87651 STREP A DNA AMP PROBE: CPT | Mod: QW,,, | Performed by: FAMILY MEDICINE

## 2023-11-14 PROCEDURE — 87635 SARS-COV-2 COVID-19 AMP PRB: CPT | Mod: QW,,, | Performed by: FAMILY MEDICINE

## 2023-11-14 RX ORDER — CEPHALEXIN 500 MG/1
500 CAPSULE ORAL EVERY 12 HOURS
Qty: 20 CAPSULE | Refills: 0 | Status: SHIPPED | OUTPATIENT
Start: 2023-11-14 | End: 2023-11-24

## 2023-11-14 RX ORDER — FLUCONAZOLE 150 MG/1
150 TABLET ORAL DAILY
Qty: 1 TABLET | Refills: 0 | Status: SHIPPED | OUTPATIENT
Start: 2023-11-14 | End: 2023-11-15

## 2023-11-14 NOTE — PROGRESS NOTES
"Subjective:      Patient ID: Hazel Bee is a 48 y.o. female.    Vitals:  height is 5' 6" (1.676 m) and weight is 64.4 kg (142 lb). Her temperature is 98.9 °F (37.2 °C). Her blood pressure is 104/70 and her pulse is 68. Her respiration is 16 and oxygen saturation is 99%.     Chief Complaint: Sinus Problem (X on and off since September but worsened :Sunday- sore throat, sinus drip, sinus congestion, fever- 100.8 )    2 days of cough, congestion, pharyngitis and now fever.         Constitution: Positive for fever. Negative for chills and fatigue.   HENT:  Positive for postnasal drip. Negative for congestion, sinus pressure and trouble swallowing.    Neck: Negative for neck pain and neck stiffness.   Cardiovascular:  Negative for chest pain, leg swelling and sob on exertion.   Respiratory:  Positive for cough. Negative for chest tightness, shortness of breath and wheezing.    Neurological:  Negative for dizziness, disorientation and altered mental status.   Psychiatric/Behavioral:  Negative for altered mental status and disorientation.       Objective:     Physical Exam   Constitutional: She is oriented to person, place, and time. She appears well-developed.  Non-toxic appearance. No distress.   HENT:   Head: Normocephalic.   Ears:   Right Ear: Tympanic membrane and external ear normal.   Left Ear: Tympanic membrane and external ear normal.   Nose: Rhinorrhea present.   Mouth/Throat: Uvula is midline and mucous membranes are normal. No uvula swelling. Posterior oropharyngeal erythema and cobblestoning present. No oropharyngeal exudate or posterior oropharyngeal edema. Tonsils are 0 on the right. Tonsils are 0 on the left. No tonsillar exudate.   Eyes: Pupils are equal, round, and reactive to light. Right eye exhibits no discharge. Left eye exhibits no discharge.   Neck: Neck supple. No tracheal deviation present.   Cardiovascular: Normal rate, regular rhythm and normal heart sounds.   No murmur " heard.  Pulmonary/Chest: Effort normal and breath sounds normal. No stridor. No respiratory distress. She has no wheezes.   Lymphadenopathy:     She has no cervical adenopathy.   Neurological: no focal deficit. She is alert and oriented to person, place, and time.   Skin: Skin is warm and dry.   Psychiatric: Mood and thought content normal.   Nursing note and vitals reviewed.      Assessment:     1. Strep pharyngitis    2. Sore throat        Plan:       Strep pharyngitis  -     cephALEXin (KEFLEX) 500 MG capsule; Take 1 capsule (500 mg total) by mouth every 12 (twelve) hours. for 10 days  Dispense: 20 capsule; Refill: 0  -     fluconazole (DIFLUCAN) 150 MG Tab; Take 1 tablet (150 mg total) by mouth once daily. for 1 day  Dispense: 1 tablet; Refill: 0    Sore throat  -     POCT Influenza A/B MOLECULAR  -     POCT COVID-19 Rapid Screening  -     POCT Strep A, Molecular           Strep test positive.   Flu and COVID tests negative.

## 2023-11-14 NOTE — PATIENT INSTRUCTIONS
Keflex as directed, warm liquids and motrin for discomfort.  Contagious until fever free for 24 hours.    Diflucan if notice any vaginal changes.

## 2023-12-06 ENCOUNTER — OFFICE VISIT (OUTPATIENT)
Dept: FAMILY MEDICINE | Facility: CLINIC | Age: 48
End: 2023-12-06
Payer: COMMERCIAL

## 2023-12-06 VITALS
DIASTOLIC BLOOD PRESSURE: 68 MMHG | TEMPERATURE: 99 F | HEART RATE: 48 BPM | HEIGHT: 66 IN | OXYGEN SATURATION: 99 % | SYSTOLIC BLOOD PRESSURE: 102 MMHG | RESPIRATION RATE: 14 BRPM | WEIGHT: 142.13 LBS | BODY MASS INDEX: 22.84 KG/M2

## 2023-12-06 DIAGNOSIS — Z12.31 ENCOUNTER FOR SCREENING MAMMOGRAM FOR BREAST CANCER: ICD-10-CM

## 2023-12-06 DIAGNOSIS — E78.00 HYPERCHOLESTEROLEMIA: ICD-10-CM

## 2023-12-06 DIAGNOSIS — R73.01 ELEVATED FASTING GLUCOSE: ICD-10-CM

## 2023-12-06 DIAGNOSIS — Z00.00 WELLNESS EXAMINATION: Primary | ICD-10-CM

## 2023-12-06 DIAGNOSIS — Z79.890 HORMONE REPLACEMENT THERAPY: ICD-10-CM

## 2023-12-06 PROBLEM — R82.71 BACTERIURIA: Status: RESOLVED | Noted: 2022-12-05 | Resolved: 2023-12-06

## 2023-12-06 PROBLEM — Z23 NEED FOR TETANUS BOOSTER: Status: RESOLVED | Noted: 2022-12-05 | Resolved: 2023-12-06

## 2023-12-06 PROCEDURE — 3008F PR BODY MASS INDEX (BMI) DOCUMENTED: ICD-10-PCS | Mod: CPTII,,, | Performed by: INTERNAL MEDICINE

## 2023-12-06 PROCEDURE — 1160F RVW MEDS BY RX/DR IN RCRD: CPT | Mod: CPTII,,, | Performed by: INTERNAL MEDICINE

## 2023-12-06 PROCEDURE — 3078F PR MOST RECENT DIASTOLIC BLOOD PRESSURE < 80 MM HG: ICD-10-PCS | Mod: CPTII,,, | Performed by: INTERNAL MEDICINE

## 2023-12-06 PROCEDURE — 1160F PR REVIEW ALL MEDS BY PRESCRIBER/CLIN PHARMACIST DOCUMENTED: ICD-10-PCS | Mod: CPTII,,, | Performed by: INTERNAL MEDICINE

## 2023-12-06 PROCEDURE — 3074F SYST BP LT 130 MM HG: CPT | Mod: CPTII,,, | Performed by: INTERNAL MEDICINE

## 2023-12-06 PROCEDURE — 3074F PR MOST RECENT SYSTOLIC BLOOD PRESSURE < 130 MM HG: ICD-10-PCS | Mod: CPTII,,, | Performed by: INTERNAL MEDICINE

## 2023-12-06 PROCEDURE — 1159F MED LIST DOCD IN RCRD: CPT | Mod: CPTII,,, | Performed by: INTERNAL MEDICINE

## 2023-12-06 PROCEDURE — 99396 PR PREVENTIVE VISIT,EST,40-64: ICD-10-PCS | Mod: ,,, | Performed by: INTERNAL MEDICINE

## 2023-12-06 PROCEDURE — 3008F BODY MASS INDEX DOCD: CPT | Mod: CPTII,,, | Performed by: INTERNAL MEDICINE

## 2023-12-06 PROCEDURE — 3078F DIAST BP <80 MM HG: CPT | Mod: CPTII,,, | Performed by: INTERNAL MEDICINE

## 2023-12-06 PROCEDURE — 1159F PR MEDICATION LIST DOCUMENTED IN MEDICAL RECORD: ICD-10-PCS | Mod: CPTII,,, | Performed by: INTERNAL MEDICINE

## 2023-12-06 PROCEDURE — 99396 PREV VISIT EST AGE 40-64: CPT | Mod: ,,, | Performed by: INTERNAL MEDICINE

## 2023-12-06 RX ORDER — ESTRADIOL 10 UG/1
1 TABLET VAGINAL
COMMUNITY
Start: 2023-11-15

## 2023-12-06 RX ORDER — ESTRADIOL 0.5 MG/1
0.5 TABLET ORAL DAILY
COMMUNITY
Start: 2023-11-13

## 2023-12-06 NOTE — PROGRESS NOTES
"Subjective:      Patient ID: Hazel Bee is a 48 y.o. female.    Chief Complaint: Annual Exam    HPI  Wellness visit    Patient reports struggling with going thru menopause sx  She is now seen by GYN Dr. Kendall  Reports that she is now on oral progesterone, oral estrogen and vaginal insert  She reports most of her mood sx have improved, but that she is struggling to lose weight  She is following WW diet and also running 5K several times a week but still at same weight, wants to be closer to 130 pounds    chronic medical history:  endometriosis  yeast infection: Boric Acid; Dr. 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, [employer]  exercise is 4 times per week  healthy diet       Family history:  Mom: HTN, HLD  Dad: HTN  Siblings: 2 brothers: HLD, Type 2 DM  Dad's Sister: Colon Cancer     Mammogram: 1/2023 [facility] neg for malignancy  Ordered 01/2024  Cologuard 12/29/2022 negative  PAP smear: completed 2021    Health Maintenance Due   Topic Date Due    Hemoglobin A1c (Prediabetes)  11/22/2023    Mammogram  01/04/2024     Review of Systems    Objective:     Vitals:    12/06/23 0814   BP: 102/68   BP Location: Left arm   Patient Position: Sitting   BP Method: Medium (Automatic)   Pulse: (!) 48   Resp: 14   Temp: 98.7 °F (37.1 °C)   TempSrc: Temporal   SpO2: 99%   Weight: 64.5 kg (142 lb 1.6 oz)   Height: 5' 6" (1.676 m)     Physical Exam  Vitals and nursing note reviewed.   Constitutional:       General: She is not in acute distress.     Appearance: She is well-developed. She is not diaphoretic.   HENT:      Head: Normocephalic and atraumatic.      Right Ear: There is impacted cerumen.      Left Ear: There is impacted cerumen.      Mouth/Throat:      Pharynx: No oropharyngeal exudate.   Eyes:      General:         Right eye: No discharge.         Left eye: No " discharge.      Conjunctiva/sclera: Conjunctivae normal.      Pupils: Pupils are equal, round, and reactive to light.   Neck:      Thyroid: No thyromegaly.   Cardiovascular:      Rate and Rhythm: Normal rate and regular rhythm.      Heart sounds: Normal heart sounds. No murmur heard.  Pulmonary:      Effort: Pulmonary effort is normal. No respiratory distress.      Breath sounds: Normal breath sounds. No wheezing or rales.   Abdominal:      General: There is no distension.      Palpations: Abdomen is soft.      Tenderness: There is no abdominal tenderness.   Musculoskeletal:      Cervical back: Normal range of motion and neck supple.   Lymphadenopathy:      Cervical: No cervical adenopathy.   Skin:     General: Skin is warm and dry.   Neurological:      Mental Status: She is alert and oriented to person, place, and time.   Psychiatric:         Mood and Affect: Mood normal.         Behavior: Behavior normal.       Assessment/Plan:     1. Wellness examination  -     Comprehensive Metabolic Panel; Future; Expected date: 12/06/2023  -     Lipid Panel; Future; Expected date: 12/06/2023  -     CBC Auto Differential; Future; Expected date: 12/06/2023  -     Hemoglobin A1C; Future; Expected date: 12/06/2023  -     Urinalysis; Future; Expected date: 12/06/2023  -     TSH; Future; Expected date: 12/06/2023  Fasting labs ordered  Mammogram ordered   Patient is very discouraged about difficulty losing weight  Advised her to continue with HRT and change her workouts, adding resistance training 2x per week, continue WW program and to not give up  Reminded her of regarding health and visceral adiposity, she has waist circ <36 in and also BMI <25, she is healthy overall but I do understand she has different physical goals and advised her to modify her exercise program and give this some time  Cerumen removed   2. Hypercholesterolemia  -     Comprehensive Metabolic Panel; Future; Expected date: 12/06/2023  -     Lipid Panel; Future;  Expected date: 12/06/2023  -     CBC Auto Differential; Future; Expected date: 12/06/2023  -     Hemoglobin A1C; Future; Expected date: 12/06/2023  -     Urinalysis; Future; Expected date: 12/06/2023  Check lipid panel  3. Elevated fasting glucose  -     Comprehensive Metabolic Panel; Future; Expected date: 12/06/2023  -     Lipid Panel; Future; Expected date: 12/06/2023  -     CBC Auto Differential; Future; Expected date: 12/06/2023  -     Hemoglobin A1C; Future; Expected date: 12/06/2023  -     Urinalysis; Future; Expected date: 12/06/2023    4. Encounter for screening mammogram for breast cancer  -     Mammo Digital Screening Bilat; Future; Expected date: 01/05/2024    5. Hormone replacement therapy  Continue HRT     Follow up in about 1 year (around 12/6/2024) for Annual Wellness.    This includes face to face time and non-face to face time preparing to see the patient (eg, review of tests), obtaining and/or reviewing separately obtained history, documenting clinical information in the electronic or other health record, independently interpreting results and communicating results to the patient/family/caregiver, or care coordinator.

## 2023-12-07 ENCOUNTER — LAB VISIT (OUTPATIENT)
Dept: LAB | Facility: HOSPITAL | Age: 48
End: 2023-12-07
Attending: INTERNAL MEDICINE
Payer: COMMERCIAL

## 2023-12-07 DIAGNOSIS — Z00.00 WELLNESS EXAMINATION: ICD-10-CM

## 2023-12-07 DIAGNOSIS — R73.01 ELEVATED FASTING GLUCOSE: ICD-10-CM

## 2023-12-07 DIAGNOSIS — E78.00 HYPERCHOLESTEROLEMIA: ICD-10-CM

## 2023-12-07 LAB
ALBUMIN SERPL-MCNC: 4 G/DL (ref 3.5–5)
ALBUMIN/GLOB SERPL: 1.2 RATIO (ref 1.1–2)
ALP SERPL-CCNC: 72 UNIT/L (ref 40–150)
ALT SERPL-CCNC: 20 UNIT/L (ref 0–55)
APPEARANCE UR: CLEAR
AST SERPL-CCNC: 27 UNIT/L (ref 5–34)
BASOPHILS # BLD AUTO: 0.08 X10(3)/MCL
BASOPHILS NFR BLD AUTO: 1.3 %
BILIRUB SERPL-MCNC: 0.4 MG/DL
BILIRUB UR QL STRIP.AUTO: NEGATIVE
BUN SERPL-MCNC: 17.4 MG/DL (ref 7–18.7)
CALCIUM SERPL-MCNC: 9.7 MG/DL (ref 8.4–10.2)
CHLORIDE SERPL-SCNC: 108 MMOL/L (ref 98–107)
CHOLEST SERPL-MCNC: 203 MG/DL
CHOLEST/HDLC SERPL: 3 {RATIO} (ref 0–5)
CO2 SERPL-SCNC: 26 MMOL/L (ref 22–29)
COLOR UR AUTO: NORMAL
CREAT SERPL-MCNC: 0.64 MG/DL (ref 0.55–1.02)
EOSINOPHIL # BLD AUTO: 0.16 X10(3)/MCL (ref 0–0.9)
EOSINOPHIL NFR BLD AUTO: 2.7 %
ERYTHROCYTE [DISTWIDTH] IN BLOOD BY AUTOMATED COUNT: 12.9 % (ref 11.5–17)
EST. AVERAGE GLUCOSE BLD GHB EST-MCNC: 99.7 MG/DL
GFR SERPLBLD CREATININE-BSD FMLA CKD-EPI: >60 MLS/MIN/1.73/M2
GLOBULIN SER-MCNC: 3.3 GM/DL (ref 2.4–3.5)
GLUCOSE SERPL-MCNC: 88 MG/DL (ref 74–100)
GLUCOSE UR QL STRIP.AUTO: NEGATIVE
HBA1C MFR BLD: 5.1 %
HCT VFR BLD AUTO: 38.6 % (ref 37–47)
HDLC SERPL-MCNC: 59 MG/DL (ref 35–60)
HGB BLD-MCNC: 12.6 G/DL (ref 12–16)
IMM GRANULOCYTES # BLD AUTO: 0.01 X10(3)/MCL (ref 0–0.04)
IMM GRANULOCYTES NFR BLD AUTO: 0.2 %
KETONES UR QL STRIP.AUTO: NEGATIVE
LDLC SERPL CALC-MCNC: 134 MG/DL (ref 50–140)
LEUKOCYTE ESTERASE UR QL STRIP.AUTO: NEGATIVE
LYMPHOCYTES # BLD AUTO: 2.11 X10(3)/MCL (ref 0.6–4.6)
LYMPHOCYTES NFR BLD AUTO: 35.3 %
MCH RBC QN AUTO: 32.1 PG (ref 27–31)
MCHC RBC AUTO-ENTMCNC: 32.6 G/DL (ref 33–36)
MCV RBC AUTO: 98.5 FL (ref 80–94)
MONOCYTES # BLD AUTO: 0.68 X10(3)/MCL (ref 0.1–1.3)
MONOCYTES NFR BLD AUTO: 11.4 %
NEUTROPHILS # BLD AUTO: 2.94 X10(3)/MCL (ref 2.1–9.2)
NEUTROPHILS NFR BLD AUTO: 49.1 %
NITRITE UR QL STRIP.AUTO: NEGATIVE
NRBC BLD AUTO-RTO: 0 %
PH UR STRIP.AUTO: 6.5 [PH]
PLATELET # BLD AUTO: 242 X10(3)/MCL (ref 130–400)
PMV BLD AUTO: 11.2 FL (ref 7.4–10.4)
POTASSIUM SERPL-SCNC: 4.5 MMOL/L (ref 3.5–5.1)
PROT SERPL-MCNC: 7.3 GM/DL (ref 6.4–8.3)
PROT UR QL STRIP.AUTO: NEGATIVE
RBC # BLD AUTO: 3.92 X10(6)/MCL (ref 4.2–5.4)
RBC UR QL AUTO: NEGATIVE
SODIUM SERPL-SCNC: 143 MMOL/L (ref 136–145)
SP GR UR STRIP.AUTO: 1.01 (ref 1–1.03)
TRIGL SERPL-MCNC: 49 MG/DL (ref 37–140)
TSH SERPL-ACNC: 1.13 UIU/ML (ref 0.35–4.94)
UROBILINOGEN UR STRIP-ACNC: 0.2
VLDLC SERPL CALC-MCNC: 10 MG/DL
WBC # SPEC AUTO: 5.98 X10(3)/MCL (ref 4.5–11.5)

## 2023-12-07 PROCEDURE — 80061 LIPID PANEL: CPT

## 2023-12-07 PROCEDURE — 84443 ASSAY THYROID STIM HORMONE: CPT

## 2023-12-07 PROCEDURE — 81003 URINALYSIS AUTO W/O SCOPE: CPT

## 2023-12-07 PROCEDURE — 80053 COMPREHEN METABOLIC PANEL: CPT

## 2023-12-07 PROCEDURE — 83036 HEMOGLOBIN GLYCOSYLATED A1C: CPT

## 2023-12-07 PROCEDURE — 36415 COLL VENOUS BLD VENIPUNCTURE: CPT

## 2023-12-07 PROCEDURE — 85025 COMPLETE CBC W/AUTO DIFF WBC: CPT

## 2023-12-12 PROBLEM — E78.2 MIXED HYPERLIPIDEMIA: Status: ACTIVE | Noted: 2023-12-12

## 2023-12-12 PROBLEM — E78.00 HYPERCHOLESTEROLEMIA: Status: RESOLVED | Noted: 2022-12-05 | Resolved: 2023-12-12

## 2024-09-18 ENCOUNTER — HOSPITAL ENCOUNTER (OUTPATIENT)
Facility: HOSPITAL | Age: 49
Discharge: HOME | End: 2024-09-19
Attending: STUDENT IN AN ORGANIZED HEALTH CARE EDUCATION/TRAINING PROGRAM | Admitting: STUDENT IN AN ORGANIZED HEALTH CARE EDUCATION/TRAINING PROGRAM
Payer: COMMERCIAL

## 2024-09-18 ENCOUNTER — ANESTHESIA EVENT (OUTPATIENT)
Dept: SURGERY | Facility: HOSPITAL | Age: 49
End: 2024-09-18
Payer: COMMERCIAL

## 2024-09-18 ENCOUNTER — ANESTHESIA (OUTPATIENT)
Dept: SURGERY | Facility: HOSPITAL | Age: 49
End: 2024-09-18
Payer: COMMERCIAL

## 2024-09-18 DIAGNOSIS — N95.0 POSTMENOPAUSAL BLEEDING: ICD-10-CM

## 2024-09-18 DIAGNOSIS — N93.9 HEMORRHAGE IN UTERUS: ICD-10-CM

## 2024-09-18 DIAGNOSIS — R10.2 PELVIC PAIN SYNDROME: ICD-10-CM

## 2024-09-18 LAB
B-HCG UR QL: NEGATIVE
CTP QC/QA: YES

## 2024-09-18 PROCEDURE — 71000015 HC POSTOP RECOV 1ST HR: Performed by: STUDENT IN AN ORGANIZED HEALTH CARE EDUCATION/TRAINING PROGRAM

## 2024-09-18 PROCEDURE — 81025 URINE PREGNANCY TEST: CPT | Performed by: STUDENT IN AN ORGANIZED HEALTH CARE EDUCATION/TRAINING PROGRAM

## 2024-09-18 PROCEDURE — 71000016 HC POSTOP RECOV ADDL HR: Performed by: STUDENT IN AN ORGANIZED HEALTH CARE EDUCATION/TRAINING PROGRAM

## 2024-09-18 PROCEDURE — 25000003 PHARM REV CODE 250: Performed by: STUDENT IN AN ORGANIZED HEALTH CARE EDUCATION/TRAINING PROGRAM

## 2024-09-18 PROCEDURE — 63600175 PHARM REV CODE 636 W HCPCS

## 2024-09-18 PROCEDURE — D9220A PRA ANESTHESIA: Mod: CRNA,,, | Performed by: NURSE ANESTHETIST, CERTIFIED REGISTERED

## 2024-09-18 PROCEDURE — 71000033 HC RECOVERY, INTIAL HOUR: Performed by: STUDENT IN AN ORGANIZED HEALTH CARE EDUCATION/TRAINING PROGRAM

## 2024-09-18 PROCEDURE — 63600175 PHARM REV CODE 636 W HCPCS: Mod: JZ,JG | Performed by: STUDENT IN AN ORGANIZED HEALTH CARE EDUCATION/TRAINING PROGRAM

## 2024-09-18 PROCEDURE — 63600175 PHARM REV CODE 636 W HCPCS: Performed by: STUDENT IN AN ORGANIZED HEALTH CARE EDUCATION/TRAINING PROGRAM

## 2024-09-18 PROCEDURE — 36000709 HC OR TIME LEV III EA ADD 15 MIN: Performed by: STUDENT IN AN ORGANIZED HEALTH CARE EDUCATION/TRAINING PROGRAM

## 2024-09-18 PROCEDURE — C1782 MORCELLATOR: HCPCS | Performed by: STUDENT IN AN ORGANIZED HEALTH CARE EDUCATION/TRAINING PROGRAM

## 2024-09-18 PROCEDURE — 63600175 PHARM REV CODE 636 W HCPCS: Performed by: NURSE ANESTHETIST, CERTIFIED REGISTERED

## 2024-09-18 PROCEDURE — 37000008 HC ANESTHESIA 1ST 15 MINUTES: Performed by: STUDENT IN AN ORGANIZED HEALTH CARE EDUCATION/TRAINING PROGRAM

## 2024-09-18 PROCEDURE — 25000003 PHARM REV CODE 250: Performed by: NURSE ANESTHETIST, CERTIFIED REGISTERED

## 2024-09-18 PROCEDURE — 37000009 HC ANESTHESIA EA ADD 15 MINS: Performed by: STUDENT IN AN ORGANIZED HEALTH CARE EDUCATION/TRAINING PROGRAM

## 2024-09-18 PROCEDURE — D9220A PRA ANESTHESIA: Mod: ANES,,, | Performed by: STUDENT IN AN ORGANIZED HEALTH CARE EDUCATION/TRAINING PROGRAM

## 2024-09-18 PROCEDURE — 27201423 OPTIME MED/SURG SUP & DEVICES STERILE SUPPLY: Performed by: STUDENT IN AN ORGANIZED HEALTH CARE EDUCATION/TRAINING PROGRAM

## 2024-09-18 PROCEDURE — 36000708 HC OR TIME LEV III 1ST 15 MIN: Performed by: STUDENT IN AN ORGANIZED HEALTH CARE EDUCATION/TRAINING PROGRAM

## 2024-09-18 RX ORDER — DEXAMETHASONE SODIUM PHOSPHATE 4 MG/ML
INJECTION, SOLUTION INTRA-ARTICULAR; INTRALESIONAL; INTRAMUSCULAR; INTRAVENOUS; SOFT TISSUE
Status: DISCONTINUED | OUTPATIENT
Start: 2024-09-18 | End: 2024-09-18

## 2024-09-18 RX ORDER — CLINDAMYCIN PHOSPHATE 600 MG/50ML
600 INJECTION, SOLUTION INTRAVENOUS
Status: DISCONTINUED | OUTPATIENT
Start: 2024-09-18 | End: 2024-09-19 | Stop reason: HOSPADM

## 2024-09-18 RX ORDER — MIDAZOLAM HYDROCHLORIDE 2 MG/2ML
INJECTION, SOLUTION INTRAMUSCULAR; INTRAVENOUS
Status: COMPLETED
Start: 2024-09-18 | End: 2024-09-18

## 2024-09-18 RX ORDER — PROPOFOL 10 MG/ML
VIAL (ML) INTRAVENOUS
Status: DISCONTINUED | OUTPATIENT
Start: 2024-09-18 | End: 2024-09-18

## 2024-09-18 RX ORDER — MIDAZOLAM HYDROCHLORIDE 2 MG/2ML
2 INJECTION, SOLUTION INTRAMUSCULAR; INTRAVENOUS ONCE
Status: COMPLETED | OUTPATIENT
Start: 2024-09-18 | End: 2024-09-18

## 2024-09-18 RX ORDER — BUPIVACAINE HYDROCHLORIDE 5 MG/ML
INJECTION, SOLUTION EPIDURAL; INTRACAUDAL
Status: DISCONTINUED | OUTPATIENT
Start: 2024-09-18 | End: 2024-09-18 | Stop reason: HOSPADM

## 2024-09-18 RX ORDER — SODIUM CHLORIDE, SODIUM LACTATE, POTASSIUM CHLORIDE, CALCIUM CHLORIDE 600; 310; 30; 20 MG/100ML; MG/100ML; MG/100ML; MG/100ML
INJECTION, SOLUTION INTRAVENOUS CONTINUOUS
Status: DISCONTINUED | OUTPATIENT
Start: 2024-09-18 | End: 2024-09-19 | Stop reason: HOSPADM

## 2024-09-18 RX ORDER — MEPERIDINE HYDROCHLORIDE 25 MG/ML
12.5 INJECTION INTRAMUSCULAR; INTRAVENOUS; SUBCUTANEOUS EVERY 10 MIN PRN
Status: ACTIVE | OUTPATIENT
Start: 2024-09-18 | End: 2024-09-18

## 2024-09-18 RX ORDER — PROCHLORPERAZINE EDISYLATE 5 MG/ML
5 INJECTION INTRAMUSCULAR; INTRAVENOUS EVERY 30 MIN PRN
Status: DISCONTINUED | OUTPATIENT
Start: 2024-09-18 | End: 2024-09-19 | Stop reason: HOSPADM

## 2024-09-18 RX ORDER — LIDOCAINE HYDROCHLORIDE 10 MG/ML
INJECTION, SOLUTION EPIDURAL; INFILTRATION; INTRACAUDAL; PERINEURAL
Status: DISCONTINUED | OUTPATIENT
Start: 2024-09-18 | End: 2024-09-18

## 2024-09-18 RX ORDER — GLYCOPYRROLATE 0.2 MG/ML
INJECTION INTRAMUSCULAR; INTRAVENOUS
Status: DISCONTINUED | OUTPATIENT
Start: 2024-09-18 | End: 2024-09-18

## 2024-09-18 RX ORDER — SCOLOPAMINE TRANSDERMAL SYSTEM 1 MG/1
1 PATCH, EXTENDED RELEASE TRANSDERMAL
Status: DISCONTINUED | OUTPATIENT
Start: 2024-09-18 | End: 2024-09-19 | Stop reason: HOSPADM

## 2024-09-18 RX ORDER — KETOROLAC TROMETHAMINE 30 MG/ML
INJECTION, SOLUTION INTRAMUSCULAR; INTRAVENOUS
Status: DISCONTINUED | OUTPATIENT
Start: 2024-09-18 | End: 2024-09-18

## 2024-09-18 RX ORDER — BUPIVACAINE HYDROCHLORIDE 5 MG/ML
INJECTION, SOLUTION EPIDURAL; INTRACAUDAL
Status: DISPENSED
Start: 2024-09-18 | End: 2024-09-18

## 2024-09-18 RX ORDER — GENTAMICIN SULFATE 80 MG/50ML
80 INJECTION, SOLUTION INTRAVENOUS
Status: DISPENSED | OUTPATIENT
Start: 2024-09-18 | End: 2024-09-18

## 2024-09-18 RX ORDER — ONDANSETRON HYDROCHLORIDE 2 MG/ML
INJECTION, SOLUTION INTRAMUSCULAR; INTRAVENOUS
Status: DISCONTINUED | OUTPATIENT
Start: 2024-09-18 | End: 2024-09-18

## 2024-09-18 RX ORDER — METOCLOPRAMIDE HYDROCHLORIDE 5 MG/ML
10 INJECTION INTRAMUSCULAR; INTRAVENOUS EVERY 10 MIN PRN
Status: DISCONTINUED | OUTPATIENT
Start: 2024-09-18 | End: 2024-09-19 | Stop reason: HOSPADM

## 2024-09-18 RX ORDER — METHOCARBAMOL 100 MG/ML
1000 INJECTION, SOLUTION INTRAMUSCULAR; INTRAVENOUS ONCE AS NEEDED
Status: COMPLETED | OUTPATIENT
Start: 2024-09-18 | End: 2024-09-18

## 2024-09-18 RX ORDER — ROCURONIUM BROMIDE 10 MG/ML
INJECTION, SOLUTION INTRAVENOUS
Status: DISCONTINUED | OUTPATIENT
Start: 2024-09-18 | End: 2024-09-18

## 2024-09-18 RX ORDER — HYDROMORPHONE HYDROCHLORIDE 2 MG/ML
0.4 INJECTION, SOLUTION INTRAMUSCULAR; INTRAVENOUS; SUBCUTANEOUS EVERY 10 MIN PRN
Status: DISCONTINUED | OUTPATIENT
Start: 2024-09-18 | End: 2024-09-19 | Stop reason: HOSPADM

## 2024-09-18 RX ORDER — FENTANYL CITRATE 50 UG/ML
INJECTION, SOLUTION INTRAMUSCULAR; INTRAVENOUS
Status: DISCONTINUED | OUTPATIENT
Start: 2024-09-18 | End: 2024-09-18

## 2024-09-18 RX ORDER — CLINDAMYCIN PHOSPHATE 150 MG/ML
600 INJECTION, SOLUTION INTRAVENOUS
Status: DISCONTINUED | OUTPATIENT
Start: 2024-09-18 | End: 2024-09-18

## 2024-09-18 RX ORDER — DIPHENHYDRAMINE HYDROCHLORIDE 50 MG/ML
25 INJECTION INTRAMUSCULAR; INTRAVENOUS EVERY 6 HOURS PRN
Status: DISCONTINUED | OUTPATIENT
Start: 2024-09-18 | End: 2024-09-19 | Stop reason: HOSPADM

## 2024-09-18 RX ADMIN — METHOCARBAMOL 1000 MG: 100 INJECTION INTRAMUSCULAR; INTRAVENOUS at 11:09

## 2024-09-18 RX ADMIN — HYDROMORPHONE HYDROCHLORIDE 0.4 MG: 2 INJECTION, SOLUTION INTRAMUSCULAR; INTRAVENOUS; SUBCUTANEOUS at 11:09

## 2024-09-18 RX ADMIN — ROCURONIUM BROMIDE 50 MG: 10 INJECTION, SOLUTION INTRAVENOUS at 10:09

## 2024-09-18 RX ADMIN — PROPOFOL 150 MG: 10 INJECTION, EMULSION INTRAVENOUS at 10:09

## 2024-09-18 RX ADMIN — GLYCOPYRROLATE 0.2 MG: 0.2 INJECTION INTRAMUSCULAR; INTRAVENOUS at 10:09

## 2024-09-18 RX ADMIN — SODIUM CHLORIDE, POTASSIUM CHLORIDE, SODIUM LACTATE AND CALCIUM CHLORIDE: 600; 310; 30; 20 INJECTION, SOLUTION INTRAVENOUS at 08:09

## 2024-09-18 RX ADMIN — MIDAZOLAM HYDROCHLORIDE 2 MG: 2 INJECTION, SOLUTION INTRAMUSCULAR; INTRAVENOUS at 09:09

## 2024-09-18 RX ADMIN — MIDAZOLAM HYDROCHLORIDE 2 MG: 1 INJECTION, SOLUTION INTRAMUSCULAR; INTRAVENOUS at 09:09

## 2024-09-18 RX ADMIN — ONDANSETRON 4 MG: 2 INJECTION INTRAMUSCULAR; INTRAVENOUS at 10:09

## 2024-09-18 RX ADMIN — KETOROLAC TROMETHAMINE 30 MG: 30 INJECTION, SOLUTION INTRAMUSCULAR at 11:09

## 2024-09-18 RX ADMIN — FENTANYL CITRATE 50 MCG: 50 INJECTION, SOLUTION INTRAMUSCULAR; INTRAVENOUS at 10:09

## 2024-09-18 RX ADMIN — CLINDAMYCIN PHOSPHATE 600 MG: 600 INJECTION, SOLUTION INTRAVENOUS at 09:09

## 2024-09-18 RX ADMIN — DEXAMETHASONE SODIUM PHOSPHATE 8 MG: 4 INJECTION, SOLUTION INTRA-ARTICULAR; INTRALESIONAL; INTRAMUSCULAR; INTRAVENOUS; SOFT TISSUE at 10:09

## 2024-09-18 RX ADMIN — SODIUM CHLORIDE, POTASSIUM CHLORIDE, SODIUM LACTATE AND CALCIUM CHLORIDE: 600; 310; 30; 20 INJECTION, SOLUTION INTRAVENOUS at 11:09

## 2024-09-18 RX ADMIN — LIDOCAINE HYDROCHLORIDE 50 MG: 10 INJECTION, SOLUTION EPIDURAL; INFILTRATION; INTRACAUDAL; PERINEURAL at 10:09

## 2024-09-18 RX ADMIN — SCOPOLAMINE 1 PATCH: 1 PATCH TRANSDERMAL at 09:09

## 2024-09-18 NOTE — ANESTHESIA PREPROCEDURE EVALUATION
09/18/2024  Hazel Bee is a 49 y.o., female.    Neg UPT  H/H historically ok    Pre-op Assessment    I have reviewed the Patient Summary Reports.     I have reviewed the Nursing Notes. I have reviewed the NPO Status.   I have reviewed the Medications.     Review of Systems  Anesthesia Hx:               Denies Personal Hx of Anesthesia complications.                    Cardiovascular:  Exercise tolerance: good                                           Pulmonary:  Pulmonary Normal                       Hepatic/GI:  Hepatic/GI Normal                 Endocrine:  Endocrine Normal                Physical Exam  General: Well nourished, Alert and Cooperative    Airway:  Mallampati: II   Mouth Opening: Normal  TM Distance: Normal  Tongue: Normal    Dental:  Intact    Chest/Lungs:  Normal Respiratory Rate    Heart:  Rate: Bradycardia        Anesthesia Plan  Type of Anesthesia, risks & benefits discussed:    Anesthesia Type: Gen ETT  Intra-op Monitoring Plan: Standard ASA Monitors  Post Op Pain Control Plan: multimodal analgesia  Induction:  IV  Informed Consent: Informed consent signed with the Patient and all parties understand the risks and agree with anesthesia plan.  All questions answered.   ASA Score: 2  Day of Surgery Review of History & Physical: H&P Update referred to the surgeon/provider.    Ready For Surgery From Anesthesia Perspective.     .

## 2024-09-18 NOTE — OP NOTE
[facility] - Periop Services  OBGYN  Operative Note    SUMMARY     Date of Procedure: 9/18/2024     Procedure:   Procedure(s) (LRB):  HYSTEROSCOPY, WITH DILATION AND CURETTAGE OF UTERUS (N/A)  LAPAROSCOPY, DIAGNOSTIC, pelvic wash (N/A)  BIOPSY, PERITONEUM       Surgeon: Chuck Hernandez Jr., MD    Assistant: Livan Kendall MD    Pre-Operative Diagnosis:   Post Menopausal Bleeding  2.   Pelvic pain      Post-Operative Diagnosis:   Same      Anesthesia: General    Technical procedure used: Procedure: Diagnostic Laparoscopy, Peritoneal Biopsy, Pelvic Washings, Diagnostic hysteroscopy, Myosure polypectomy    Complications: none    EBL:  10 cc    Urine output: 100 cc    IV Fluids: 700    Brief findings: Small adhesion in posterior cul de sac, biopsy taken in region concerning for possible endometriosis. Hysteroscopy w/ large polyp removed. Fallopian tubes, uterine body, and liver were all normal in appearance. Appendix not seen    Myosure Fluid deficit: 20 cc       Specimens:  ID Type Source Tests Collected by Time Destination   A :  Body Fluid Pelvic Washing CYTOLOGY SPECIMEN- MEDICAL CYTOLOGY (FLUID/WASH/BRUSH) Jr Chuck Hernandez MD 9/18/2024 0945    B : ENDOMETRIAL POLYPS AND CURETTING Tissue Endometrium SPECIMEN TO PATHOLOGY Jr Chuck Hernandez MD 9/18/2024 1032    C : RIGHT POSTERIOR CUL DE SAC PERITONEAL BIOPSY Tissue Peritoneal SPECIMEN TO PATHOLOGY Jr Chuck Hernandez MD 9/18/2024 1052               Procedure in Detail:        After informed consent and negative hCG was verified patient was taken to the operating room with IV fluid running. She was then administered general endotracheal anesthesia, and prepped and draped in low lithotomy position using Jenaro stirrups. The patient's arms were tucked at her sides. A Haji catheter was placed into the bladder.    Sterile speculum was placed in the vagina and the cervix was well visualized. A single tooth tenaculum was placed on the anterior  lip of the cervix and the Hegar cervical dilators were used to gently dilate the cervix to 6 mm. At this time the hysteroscope was introduced into the cervix and gently advanced to the fundus. The ostia were visualized bilaterally. A large fundal polyp was visualized. Picture taken     The Myosure device was inserted into the cavity. The polyp was dissected to the base. Samples were taken of the endometrium. No other lesions were noted. The hysteroscope was removed from the uterus and endocervical currettings were sent to path. No signs of uterine perforation.     The single tooth tenaculum was removed from the anterior lip of the cervix. Spongestick was applied for hemostasis. Excellent hemostasis was observed. The speculum was removed from the vagina and this portion of the procedure was completed.     Attention was then turned towards the abdomen where the base of the umbilicus a 5 mm vertical skin incision was made with scalpel at the base of the umbilicus.  A Varese needle and placed in the intraperitoneal cavity. Intraperitoneal placement confirmed by instillation of normal saline and ball test.  The peritoneum was then obtained with CO2 opening pressure that was noted to be approximately 8 mmHg. Pressure was initially set at 15 mm of mercury. A LLQ trocar was then inserted under direct visualization following a 5mm skin incision.      At this point the patient was placed in Trendelenburg. The bowel was swept out of the posterior cul-de-sac to the degree possible with on small adhesion present. Diagnostic laparoscopy was then performed with findings as described above. Pelvic washings were taken and sent for cytology. Next, a biopsy was done in the posterior cul de sac near an area of concern for possible endometriosis. A touch of monopolar cautery was applied to the peritoneum for hemostasis at the biopsy site.      The patient was taken out of Trendelenburg. Pelvis was again inspected and noted to be  hemostatic. Intraabdominal pressure was dropped and biopsy site still hemostatic. Pneumoperitoneum was then evacuated. All trocars were removed from the abdomen. The skin incisions for all 2 trocar sites were then closed with 4-0 Monocryl in subcuticular fashion. The skin incisions were then sealed with Dermabond. Haji catheter was removed at the end of the case. Instrument sponge and needle counts were correct x2. The patient was extubated and returned to the recovery room awake and in stable condition.       Disposition: stable, to home after meeting discharge criteria today.      Chuck Hernandez Jr, MD  09/18/2024 11:18 AM

## 2024-09-18 NOTE — DISCHARGE SUMMARY
[facility] - Periop Services  Discharge Note  Short Stay    Procedure(s) (LRB):  HYSTEROSCOPY, WITH DILATION AND CURETTAGE OF UTERUS (N/A)  LAPAROSCOPY, DIAGNOSTIC, pelvic wash (N/A)  BIOPSY, PERITONEUM      OUTCOME: Patient tolerated treatment/procedure well without complication and is now ready for discharge.    DISPOSITION: Home or Self Care    FINAL DIAGNOSIS:  Postmenopausal bleeding    FOLLOWUP: In clinic    DISCHARGE INSTRUCTIONS:  No discharge procedures on file.     TIME SPENT ON DISCHARGE: 30 minutes

## 2024-09-18 NOTE — ANESTHESIA PROCEDURE NOTES
Intubation    Date/Time: 9/18/2024 10:06 AM    Performed by: Edwige Welch CRNA  Authorized by: Kayode Ramirez MD    Intubation:     Induction:  Intravenous    Intubated:  Postinduction    Mask Ventilation:  Easy with oral airway    Attempts:  1    Attempted By:  CRNA    Method of Intubation:  Direct    Blade:  Marilu 3    Laryngeal View Grade: Grade I - full view of cords      Difficult Airway Encountered?: No      Complications:  None    Airway Device:  Oral endotracheal tube    Airway Device Size:  7.0    Style/Cuff Inflation:  Cuffed (inflated to minimal occlusive pressure)    Tube secured:  21    Secured at:  The lips    Placement Verified By:  Capnometry    Complicating Factors:  None    Findings Post-Intubation:  BS equal bilateral and atraumatic/condition of teeth unchanged

## 2024-09-18 NOTE — TRANSFER OF CARE
"Anesthesia Transfer of Care Note    Patient: Hazel Bee    Procedure(s) Performed: Procedure(s) (LRB):  HYSTEROSCOPY, WITH DILATION AND CURETTAGE OF UTERUS (N/A)  LAPAROSCOPY, DIAGNOSTIC, pelvic wash (N/A)  BIOPSY, PERITONEUM    Patient location: PACU    Anesthesia Type: general    Transport from OR: Transported from OR on room air with adequate spontaneous ventilation    Post pain: adequate analgesia    Post assessment: no apparent anesthetic complications    Post vital signs: stable    Level of consciousness: responds to stimulation    Nausea/Vomiting: no nausea/vomiting    Complications: none    Transfer of care protocol was followed      Last vitals: Visit Vitals  /65   Pulse 78   Temp 36.3 °C (97.3 °F)   Resp 15   Ht 5' 7" (1.702 m)   Wt 62.6 kg (138 lb 0.1 oz)   SpO2 99%   Breastfeeding No   BMI 21.62 kg/m²     "

## 2024-09-19 VITALS
DIASTOLIC BLOOD PRESSURE: 75 MMHG | WEIGHT: 138 LBS | HEIGHT: 67 IN | HEART RATE: 51 BPM | TEMPERATURE: 97 F | BODY MASS INDEX: 21.66 KG/M2 | RESPIRATION RATE: 15 BRPM | OXYGEN SATURATION: 100 % | SYSTOLIC BLOOD PRESSURE: 128 MMHG

## 2024-09-19 LAB — PSYCHE PATHOLOGY RESULT: NORMAL

## 2024-09-19 RX ORDER — SILVER NITRATE 38.21; 12.74 MG/1; MG/1
STICK TOPICAL
Status: DISCONTINUED
Start: 2024-09-19 | End: 2024-09-19 | Stop reason: HOSPADM

## 2024-09-21 ENCOUNTER — NURSE TRIAGE (OUTPATIENT)
Dept: ADMINISTRATIVE | Facility: CLINIC | Age: 49
End: 2024-09-21
Payer: COMMERCIAL

## 2024-09-21 NOTE — TELEPHONE ENCOUNTER
Pt reports had surgery on Wednesday, having Herpes type 1 outbreak, wanting to see if a prescription could be sent in for it. Pt advised to see a MD within 24 hours per protocol via ED/UC/ODVV, Pt requested to try to reach the on call MD since she is a post op pt. Call placed to Dr. Hernandez's after hours answering service, but was told by the  that the on call MD is only available for emergency situations and that prescriptions of any kind must be handled thru the office when it is open. Pt informed and encouraged to call back with any worsening symptoms or questions. She verbalized understanding.    Reason for Disposition   Genital area looks infected (e.g., draining sore, spreading redness)    Additional Information   Negative: Patient sounds very sick or weak to the triager   Negative: [1] SEVERE pain AND [2] not improved 2 hours after pain medicine   Negative: [1] Genital area looks infected (e.g., draining sore, spreading redness) AND [2] fever   Negative: MODERATE-SEVERE itching (i.e., interferes with school, work, or sleep)    Protocols used: Vulvar Symptoms-A-AH

## 2024-09-22 NOTE — ANESTHESIA POSTPROCEDURE EVALUATION
Anesthesia Post Evaluation    Patient: Hazel Bee    Procedure(s) Performed: Procedure(s) (LRB):  HYSTEROSCOPY, WITH DILATION AND CURETTAGE OF UTERUS (N/A)  LAPAROSCOPY, DIAGNOSTIC, pelvic wash (N/A)  BIOPSY, PERITONEUM    Final Anesthesia Type: general      Patient location during evaluation: PACU  Patient participation: Yes- Able to Participate  Level of consciousness: awake and alert  Post-procedure vital signs: reviewed and stable  Pain management: adequate  Airway patency: patent    PONV status at discharge: No PONV  Anesthetic complications: no      Respiratory status: unassisted  Hydration status: euvolemic  Follow-up not needed.              Vitals Value Taken Time   /75 09/18/24 1423   Temp 36.3 °C (97.3 °F) 09/18/24 1119   Pulse 51 09/18/24 1423   Resp 18 09/18/24 1204   SpO2 100 % 09/18/24 1423   Vitals shown include unfiled device data.      Event Time   Out of Recovery 12:04:00         Pain/Bakari Score: No data recorded

## 2024-11-27 ENCOUNTER — TELEPHONE (OUTPATIENT)
Dept: FAMILY MEDICINE | Facility: CLINIC | Age: 49
End: 2024-11-27
Payer: COMMERCIAL

## 2024-11-27 DIAGNOSIS — E78.00 HYPERCHOLESTEROLEMIA: ICD-10-CM

## 2024-11-27 DIAGNOSIS — R73.01 ELEVATED FASTING GLUCOSE: ICD-10-CM

## 2024-11-27 DIAGNOSIS — Z00.00 WELLNESS EXAMINATION: Primary | ICD-10-CM

## 2024-11-27 NOTE — TELEPHONE ENCOUNTER
----- Message from Helene sent at 11/27/2024  8:51 AM CST -----  Who Called: Hazel Bee    What order is the patient requesting: labs   When does the expect the orders to be performed? ortho    Preferred Method of Contact: Phone Call  Patient's Preferred Phone Number on File: [phone]   Best Call Back Number, if different:  Additional Information: order request, please place lab orders in epic, ##pt called highly upset to discuss why labs were not placed in her chart after calling several times pt stated for request, pt stated she was at lab this morning and orders were not in##, please advise,  thanks

## 2024-11-27 NOTE — TELEPHONE ENCOUNTER
Spoke with pt  Apologized about labs not being put in when she requested  Nothing in chart requesting labs   Advised that labs are in and can have them done when she has time  Pt expressed understanding  Thanks

## 2024-12-31 ENCOUNTER — OFFICE VISIT (OUTPATIENT)
Dept: URGENT CARE | Facility: CLINIC | Age: 49
End: 2024-12-31
Payer: COMMERCIAL

## 2024-12-31 VITALS
BODY MASS INDEX: 21.66 KG/M2 | HEIGHT: 67 IN | TEMPERATURE: 98 F | RESPIRATION RATE: 17 BRPM | SYSTOLIC BLOOD PRESSURE: 108 MMHG | WEIGHT: 138 LBS | OXYGEN SATURATION: 100 % | HEART RATE: 62 BPM | DIASTOLIC BLOOD PRESSURE: 70 MMHG

## 2024-12-31 DIAGNOSIS — H10.32 ACUTE BACTERIAL CONJUNCTIVITIS OF LEFT EYE: ICD-10-CM

## 2024-12-31 DIAGNOSIS — H66.92 LEFT OTITIS MEDIA, UNSPECIFIED OTITIS MEDIA TYPE: Primary | ICD-10-CM

## 2024-12-31 PROCEDURE — 99213 OFFICE O/P EST LOW 20 MIN: CPT | Mod: ,,, | Performed by: FAMILY MEDICINE

## 2024-12-31 RX ORDER — CEFDINIR 300 MG/1
300 CAPSULE ORAL 2 TIMES DAILY
Qty: 14 CAPSULE | Refills: 0 | Status: SHIPPED | OUTPATIENT
Start: 2024-12-31 | End: 2025-01-07

## 2024-12-31 RX ORDER — CLOBETASOL PROPIONATE 0.5 MG/G
CREAM TOPICAL
COMMUNITY
Start: 2024-11-26

## 2024-12-31 RX ORDER — OFLOXACIN 3 MG/ML
1 SOLUTION/ DROPS OPHTHALMIC 4 TIMES DAILY
Qty: 10 ML | Refills: 0 | Status: SHIPPED | OUTPATIENT
Start: 2024-12-31

## 2024-12-31 NOTE — PATIENT INSTRUCTIONS
Ear:  Take over-the-counter Tylenol or Motrin as directed for pain if needed.  Take antibiotics as prescribed.  Do not stop taking them just because you feel better.  Try a warm moist washcloth on the ear.  This may help relieve pain.   Call your doctor or seek immediate medical care if you develop new or increased ear pain, new or increased pus or blood draining from your ear, develop a fever with a stiff neck or severe headache, you do not get better after taking antibiotics for 2 days, if any new or worsening symptoms arise while at home.        Eye:  Medication sent to pharmacy take as prescribed  Wash your hands often especially before and after you apply your eye medication.  Pink eye is very contagious.  Use a moist washcloth to remove any crust.  Can use a mild baby shampoo as well.  Wipe from the inside corner of the eye to the outside.  Put a cool or warm wet cloth on your eye a few times a day to help with pain and swelling.  Do not wear contact lenses or eye makeup until pink eye is resolved.  Throw away all eye makeup.  Once pink eye has resolved, make sure to use all new eye makeup.   Do not share towels, pillows, or washcloth until your pink eye has resolved.  Call or seek immediate medical attention if you have new or worsening pain in your eye, you have a change in vision or vision loss, you have an increased amount of discharge from the eye, your eye has not started to improve once you begin antibiotics, or if you have any new or worrisome symptoms that arise at home.

## 2024-12-31 NOTE — PROGRESS NOTES
"Subjective:      Patient ID: Hazel Bee is a 49 y.o. female.    Vitals:  height is 5' 7" (1.702 m) and weight is 62.6 kg (138 lb). Her temperature is 97.6 °F (36.4 °C). Her blood pressure is 108/70 and her pulse is 62. Her respiration is 17 and oxygen saturation is 100%.     Chief Complaint: Sinus Problem     Patient is a 49 y.o. female who presents to urgent care with complaints of possible sinus infection x 1.5 weeks with acute manifestations of ocular redness/discharge x yesterday. Subjective reports sinus congestion accompanied with bilateral ear fullness--also details griselda/gritty sensations with mucus discharge to the left eye. (-) Alleviating factors. Patient denies sore throat, fever, BA, chills, n/v/d, new onset visual disturbances/changes in visual acuity, lid swelling.      Sinus Problem  Associated symptoms include congestion and ear pain. Pertinent negatives include no chills, diaphoresis, sinus pressure or sore throat.     Constitution: Negative for chills, sweating, fatigue and fever.   HENT:  Positive for ear pain, congestion and postnasal drip. Negative for ear discharge, tinnitus, hearing loss, sinus pain, sinus pressure and sore throat.    Neck: neck negative.   Cardiovascular: Negative.    Eyes:  Positive for eye discharge, eye itching and eye redness.   Respiratory: Negative.     Gastrointestinal: Negative.    Endocrine: negative.   Genitourinary: Negative.    Musculoskeletal: Negative.    Skin: Negative.    Allergic/Immunologic: Negative.    Neurological: Negative.  Negative for disorientation and altered mental status.   Hematologic/Lymphatic: Negative.    Psychiatric/Behavioral:  Negative for altered mental status, disorientation and confusion.       Objective:     Physical Exam   Constitutional: She is oriented to person, place, and time. She appears well-developed. She is cooperative.  Non-toxic appearance. She does not appear ill. No distress.   HENT:   Head: Normocephalic and " Patient had labs and saw the (worsened) results on line from 11/27/19.  She had been given refill extension last month with the expectation of office visit to follow up - she currently has no future appointment scheduled.  Has not been seen since 3/2019, at which time her labs looked ugly from cortisone shots, lack of activity and inattentiveness to diet.    She needs an office visit.    atraumatic.   Ears:   Right Ear: Hearing, tympanic membrane, external ear and ear canal normal.   Left Ear: Hearing, external ear and ear canal normal. Tympanic membrane is erythematous and bulging.   Nose: Congestion present. No mucosal edema, rhinorrhea or nasal deformity. No epistaxis. Right sinus exhibits no maxillary sinus tenderness and no frontal sinus tenderness. Left sinus exhibits no maxillary sinus tenderness and no frontal sinus tenderness.   Mouth/Throat: Uvula is midline, oropharynx is clear and moist and mucous membranes are normal. No trismus in the jaw. Normal dentition. No uvula swelling. No oropharyngeal exudate, posterior oropharyngeal edema or posterior oropharyngeal erythema.   Eyes: Lids are normal. Left eye exhibits discharge and exudate. Left conjunctiva is injected. No scleral icterus. vision grossly intact   Neck: Trachea normal and phonation normal. Neck supple. No edema present. No erythema present. No neck rigidity present.   Cardiovascular: Normal rate, regular rhythm, normal heart sounds and normal pulses.   Pulmonary/Chest: Effort normal and breath sounds normal. No respiratory distress. She has no decreased breath sounds. She has no wheezes. She has no rhonchi. She has no rales.   Abdominal: Normal appearance.   Musculoskeletal: Normal range of motion.         General: No deformity. Normal range of motion.   Neurological: She is alert and oriented to person, place, and time. She exhibits normal muscle tone. Coordination normal.   Skin: Skin is warm, dry, intact, not diaphoretic and not pale.   Psychiatric: Her speech is normal and behavior is normal. Judgment and thought content normal.   Nursing note and vitals reviewed.         Previous History      Review of patient's allergies indicates:   Allergen Reactions    Amoxicillin      Other reaction(s): sever yeast infection    Amoxicillin-pot clavulanate      Other reaction(s): Bacterial vaginal infection......., Vaginal yeast  "infection.......       Past Medical History:   Diagnosis Date    Hypercholesterolemia 12/05/2022    Other seasonal allergic rhinitis     Post-menopausal bleeding      Current Outpatient Medications   Medication Instructions    azelastine (ASTELIN) 137 mcg (0.1 %) nasal spray As needed (PRN)    cefdinir (OMNICEF) 300 mg, Oral, 2 times daily    clobetasoL (TEMOVATE) 0.05 % cream SMARTSIG:sparingly Topical Twice a Week    estradioL (ESTRACE) 0.5 mg, Daily    fluticasone propionate (FLONASE) 100 mcg, Each Nostril, Daily    ofloxacin (OCUFLOX) 0.3 % ophthalmic solution 1 drop, Left Eye, 4 times daily    progesterone (PROMETRIUM) 200 mg, Nightly    YUVAFEM 10 mcg Tab 1 tablet, Twice weekly     Past Surgical History:   Procedure Laterality Date    BIOPSY OF PERITONEUM  9/18/2024    Procedure: BIOPSY, PERITONEUM;  Surgeon: Jr Chuck Hernandez MD;  Location: [facility] OR;  Service: OB/GYN;;    DIAGNOSTIC LAPAROSCOPY N/A 9/18/2024    Procedure: LAPAROSCOPY, DIAGNOSTIC, pelvic wash;  Surgeon: Jr Chuck Hernandez MD;  Location: [facility] OR;  Service: OB/GYN;  Laterality: N/A;    HYSTEROSCOPY WITH DILATION AND CURETTAGE OF UTERUS N/A 9/18/2024    Procedure: HYSTEROSCOPY, WITH DILATION AND CURETTAGE OF UTERUS;  Surgeon: Jr Cuhck Hernandez MD;  Location: [facility] OR;  Service: OB/GYN;  Laterality: N/A;    TYMPANOPLASTY Left     March 2022     Family History   Problem Relation Name Age of Onset    No Known Problems Mother      No Known Problems Father      No Known Problems Sister      No Known Problems Brother         Social History     Tobacco Use    Smoking status: Never    Smokeless tobacco: Never   Substance Use Topics    Alcohol use: Yes     Comment: once a month    Drug use: Never        Physical Exam      Vital Signs Reviewed   /70 (Patient Position: Sitting)   Pulse 62   Temp 97.6 °F (36.4 °C)   Resp 17   Ht 5' 7" (1.702 m)   Wt 62.6 kg (138 lb)   SpO2 100%   BMI 21.61 kg/m²        Procedures    Procedures     " Labs     Results for orders placed or performed during the hospital encounter of 09/18/24   POCT urine pregnancy    Collection Time: 09/18/24  8:17 AM   Result Value Ref Range    POC Preg Test, Ur Negative Negative     Acceptable Yes    Specimen to Pathology    Collection Time: 09/18/24 10:32 AM   Result Value Ref Range    Pathology Result        Assessment:     1. Left otitis media, unspecified otitis media type    2. Acute bacterial conjunctivitis of left eye        Plan:   Ear:  Take over-the-counter Tylenol or Motrin as directed for pain if needed.  Take antibiotics as prescribed.  Do not stop taking them just because you feel better.  Try a warm moist washcloth on the ear.  This may help relieve pain.   Call your doctor or seek immediate medical care if you develop new or increased ear pain, new or increased pus or blood draining from your ear, develop a fever with a stiff neck or severe headache, you do not get better after taking antibiotics for 2 days, if any new or worsening symptoms arise while at home.        Eye:  Medication sent to pharmacy take as prescribed  Wash your hands often especially before and after you apply your eye medication.  Pink eye is very contagious.  Use a moist washcloth to remove any crust.  Can use a mild baby shampoo as well.  Wipe from the inside corner of the eye to the outside.  Put a cool or warm wet cloth on your eye a few times a day to help with pain and swelling.  Do not wear contact lenses or eye makeup until pink eye is resolved.  Throw away all eye makeup.  Once pink eye has resolved, make sure to use all new eye makeup.   Do not share towels, pillows, or washcloth until your pink eye has resolved.  Call or seek immediate medical attention if you have new or worsening pain in your eye, you have a change in vision or vision loss, you have an increased amount of discharge from the eye, your eye has not started to improve once you begin antibiotics, or if  you have any new or worrisome symptoms that arise at home.     Left otitis media, unspecified otitis media type    Acute bacterial conjunctivitis of left eye    Other orders  -     ofloxacin (OCUFLOX) 0.3 % ophthalmic solution; Place 1 drop into the left eye 4 (four) times daily.  Dispense: 10 mL; Refill: 0  -     cefdinir (OMNICEF) 300 MG capsule; Take 1 capsule (300 mg total) by mouth 2 (two) times daily. for 7 days  Dispense: 14 capsule; Refill: 0

## 2025-01-02 ENCOUNTER — TELEPHONE (OUTPATIENT)
Dept: URGENT CARE | Facility: CLINIC | Age: 50
End: 2025-01-02
Payer: COMMERCIAL

## 2025-01-02 DIAGNOSIS — H66.92 LEFT OTITIS MEDIA, UNSPECIFIED OTITIS MEDIA TYPE: Primary | ICD-10-CM

## 2025-01-02 RX ORDER — CIPROFLOXACIN AND DEXAMETHASONE 3; 1 MG/ML; MG/ML
4 SUSPENSION/ DROPS AURICULAR (OTIC) 2 TIMES DAILY
Qty: 7.5 ML | Refills: 0 | Status: SHIPPED | OUTPATIENT
Start: 2025-01-02 | End: 2025-01-09

## 2025-01-02 NOTE — TELEPHONE ENCOUNTER
I spoke with patient regarding rx sent to pharmacy. Patient verbalized understanding and expressed thanks.

## 2025-01-26 ENCOUNTER — OFFICE VISIT (OUTPATIENT)
Dept: URGENT CARE | Facility: CLINIC | Age: 50
End: 2025-01-26
Payer: COMMERCIAL

## 2025-01-26 VITALS
SYSTOLIC BLOOD PRESSURE: 106 MMHG | OXYGEN SATURATION: 100 % | HEART RATE: 66 BPM | HEIGHT: 67 IN | TEMPERATURE: 98 F | BODY MASS INDEX: 21.66 KG/M2 | RESPIRATION RATE: 17 BRPM | DIASTOLIC BLOOD PRESSURE: 66 MMHG | WEIGHT: 138 LBS

## 2025-01-26 DIAGNOSIS — B37.2 SKIN YEAST INFECTION: Primary | ICD-10-CM

## 2025-01-26 PROCEDURE — 99213 OFFICE O/P EST LOW 20 MIN: CPT | Mod: ,,, | Performed by: FAMILY MEDICINE

## 2025-01-26 RX ORDER — CLOTRIMAZOLE AND BETAMETHASONE DIPROPIONATE 10; .64 MG/G; MG/G
CREAM TOPICAL 2 TIMES DAILY
Qty: 45 G | Refills: 0 | Status: SHIPPED | OUTPATIENT
Start: 2025-01-26 | End: 2025-02-02

## 2025-01-26 RX ORDER — FLUCONAZOLE 150 MG/1
150 TABLET ORAL
Qty: 4 TABLET | Refills: 0 | Status: SHIPPED | OUTPATIENT
Start: 2025-01-26 | End: 2025-02-05

## 2025-01-26 NOTE — PROGRESS NOTES
"Patient ID: Hazel Bee is a 49 y.o. female.  Chief Complaint: Rash    HPI:   Patient presents here today for above reason.       Patient is a 49 y.o. female who presents to urgent care with complaints of rash located to the anal/inguinal region. Previously seen in urgent care 12/31/2024 for sinus problem, where she was prescribed 2 abx: cefdinir and ofloxacin. Reports onset rash day 4 of abx use (01/04/2025). From there, followed up with obgyn, stevo'd with "lesion sclerosis". Prescribed clobetason propionate topical ointment for minimal pain resolve, however, nothing resolves rash itself. Also describes burning with topical ointment application. Max pain: 8/10. Exacerbated with BM.  (-) lesion discharge, fever. (+) dysuria.     Past Medical History:  Past Medical History:   Diagnosis Date    Hypercholesterolemia 12/05/2022    Other seasonal allergic rhinitis     Post-menopausal bleeding      Past Surgical History:   Procedure Laterality Date    BIOPSY OF PERITONEUM  9/18/2024    Procedure: BIOPSY, PERITONEUM;  Surgeon: Jr Chuck Hernandez MD;  Location: [facility] OR;  Service: OB/GYN;;    DIAGNOSTIC LAPAROSCOPY N/A 9/18/2024    Procedure: LAPAROSCOPY, DIAGNOSTIC, pelvic wash;  Surgeon: Jr Chuck Hernandez MD;  Location: [facility] OR;  Service: OB/GYN;  Laterality: N/A;    HYSTEROSCOPY WITH DILATION AND CURETTAGE OF UTERUS N/A 9/18/2024    Procedure: HYSTEROSCOPY, WITH DILATION AND CURETTAGE OF UTERUS;  Surgeon: Jr Chuck Hernandez MD;  Location: [facility] OR;  Service: OB/GYN;  Laterality: N/A;    TYMPANOPLASTY Left     March 2022     Review of patient's allergies indicates:   Allergen Reactions    Amoxicillin      Other reaction(s): sever yeast infection    Amoxicillin-pot clavulanate      Other reaction(s): Bacterial vaginal infection......., Vaginal yeast infection.......     Current Outpatient Medications   Medication Instructions    azelastine (ASTELIN) 137 mcg (0.1 %) nasal spray As needed (PRN)    clobetasoL " "(TEMOVATE) 0.05 % cream SMARTSIG:sparingly Topical Twice a Week    clotrimazole-betamethasone 1-0.05% (LOTRISONE) cream Topical (Top), 2 times daily    estradioL (ESTRACE) 0.5 mg, Daily    fluconazole (DIFLUCAN) 150 mg, Oral, Every 3 days    fluticasone propionate (FLONASE) 100 mcg, Each Nostril, Daily    ofloxacin (OCUFLOX) 0.3 % ophthalmic solution 1 drop, Left Eye, 4 times daily    progesterone (PROMETRIUM) 200 mg, Nightly    YUVAFEM 10 mcg Tab 1 tablet, Twice weekly     Social History     Socioeconomic History    Marital status:    Tobacco Use    Smoking status: Never    Smokeless tobacco: Never   Substance and Sexual Activity    Alcohol use: Yes     Comment: once a month    Drug use: Never    Sexual activity: Yes     Partners: Male       ROS:   Review of Systems  12 point review of systems conducted, negative except as stated in the history of present illness. See HPI for details.   Vitals/PE:   Visit Vitals  /66 (Patient Position: Sitting)   Pulse 66   Temp 97.8 °F (36.6 °C)   Resp 17   Ht 5' 7" (1.702 m)   Wt 62.6 kg (138 lb)   SpO2 100%   BMI 21.61 kg/m²     Physical Exam  General: Alert and oriented, No acute distress.   Eye: Normal conjunctiva without exudate.  HENMT: Normocephalic/AT, Normal hearing, Oral mucosa is moist and pink   Neck: No goiter visualized.   Respiratory: Lungs CTAB, Respirations are non-labored, Breath sounds are equal, Symmetrical chest wall expansion.  Cardiovascular: Normal rate, Regular rhythm, No murmur, No edema.   Gastrointestinal: Non-distended.   Genitourinary: Deferred.  Musculoskeletal: Normal ROM, Normal gait, No deformities or amputations.  Integumentary: Warm, Dry, Intact. No diaphoresis, or flushing.  Neurologic: No focal deficits, Cranial Nerves II-XII are grossly intact.   Psychiatric: Cooperative, Appropriate mood & affect, Normal judgment, Non-suicidal.    Results for orders placed or performed during the hospital encounter of 09/18/24   POCT urine " pregnancy    Collection Time: 09/18/24  8:17 AM   Result Value Ref Range    POC Preg Test, Ur Negative Negative     Acceptable Yes    Specimen to Pathology    Collection Time: 09/18/24 10:32 AM   Result Value Ref Range    Pathology Result       Assessment/Plan:   Skin yeast infection  -     fluconazole (DIFLUCAN) 150 MG Tab; Take 1 tablet (150 mg total) by mouth Every 3 (three) days. for 4 doses  Dispense: 4 tablet; Refill: 0  -     clotrimazole-betamethasone 1-0.05% (LOTRISONE) cream; Apply topically 2 (two) times daily. for 7 days  Dispense: 45 g; Refill: 0      Start on medications as above.  After applying above topical ointment to irritated areas/perianal areas, follow with OTC Boudreauxs paste versus Desitin.    Start on daily Probiotic.        Education and counseling done face to face regarding medical conditions and plan. Contact office if new symptoms develop. Should any symptoms ever significantly worsen seek emergency medical attention/go to ER. Follow up at least yearly for wellness or sooner PRN. Nurse to call patient with any results. The patient is receptive, expresses understanding and is agreeable to plan. All questions have been answered.

## 2025-01-26 NOTE — PATIENT INSTRUCTIONS
Assessment/Plan:   Skin yeast infection  -     fluconazole (DIFLUCAN) 150 MG Tab; Take 1 tablet (150 mg total) by mouth Every 3 (three) days. for 4 doses  Dispense: 4 tablet; Refill: 0  -     clotrimazole-betamethasone 1-0.05% (LOTRISONE) cream; Apply topically 2 (two) times daily. for 7 days  Dispense: 45 g; Refill: 0      Start on medications as above.  After applying above topical ointment to irritated areas/perianal areas, follow with OTC Boudreauxs paste versus Desitin.    Start on daily Probiotic.        Education and counseling done face to face regarding medical conditions and plan. Contact office if new symptoms develop. Should any symptoms ever significantly worsen seek emergency medical attention/go to ER. Follow up at least yearly for wellness or sooner PRN. Nurse to call patient with any results. The patient is receptive, expresses understanding and is agreeable to plan. All questions have been answered.

## 2025-03-20 ENCOUNTER — TELEPHONE (OUTPATIENT)
Dept: FAMILY MEDICINE | Facility: CLINIC | Age: 50
End: 2025-03-20
Payer: COMMERCIAL

## 2025-03-20 DIAGNOSIS — Z79.890 HORMONE REPLACEMENT THERAPY: ICD-10-CM

## 2025-03-20 DIAGNOSIS — R73.01 ELEVATED FASTING GLUCOSE: ICD-10-CM

## 2025-03-20 DIAGNOSIS — Z00.00 WELLNESS EXAMINATION: Primary | ICD-10-CM

## 2025-03-20 DIAGNOSIS — E78.00 HYPERCHOLESTEROLEMIA: ICD-10-CM

## 2025-04-16 ENCOUNTER — RESULTS FOLLOW-UP (OUTPATIENT)
Dept: FAMILY MEDICINE | Facility: CLINIC | Age: 50
End: 2025-04-16

## 2025-04-16 ENCOUNTER — LAB VISIT (OUTPATIENT)
Dept: LAB | Facility: HOSPITAL | Age: 50
End: 2025-04-16
Attending: INTERNAL MEDICINE
Payer: COMMERCIAL

## 2025-04-16 DIAGNOSIS — R73.01 ELEVATED FASTING GLUCOSE: ICD-10-CM

## 2025-04-16 DIAGNOSIS — Z79.890 HORMONE REPLACEMENT THERAPY: ICD-10-CM

## 2025-04-16 DIAGNOSIS — E78.00 HYPERCHOLESTEROLEMIA: ICD-10-CM

## 2025-04-16 DIAGNOSIS — Z00.00 WELLNESS EXAMINATION: ICD-10-CM

## 2025-04-16 LAB
ALBUMIN SERPL-MCNC: 3.8 G/DL (ref 3.5–5)
ALBUMIN/GLOB SERPL: 1.2 RATIO (ref 1.1–2)
ALP SERPL-CCNC: 39 UNIT/L (ref 40–150)
ALT SERPL-CCNC: 15 UNIT/L (ref 0–55)
ANION GAP SERPL CALC-SCNC: 5 MEQ/L
AST SERPL-CCNC: 24 UNIT/L (ref 11–45)
BASOPHILS # BLD AUTO: 0.05 X10(3)/MCL
BASOPHILS NFR BLD AUTO: 0.9 %
BILIRUB SERPL-MCNC: 0.6 MG/DL
BUN SERPL-MCNC: 14.4 MG/DL (ref 7–18.7)
CALCIUM SERPL-MCNC: 9 MG/DL (ref 8.4–10.2)
CHLORIDE SERPL-SCNC: 107 MMOL/L (ref 98–107)
CHOLEST SERPL-MCNC: 192 MG/DL
CHOLEST/HDLC SERPL: 3 {RATIO} (ref 0–5)
CO2 SERPL-SCNC: 27 MMOL/L (ref 22–29)
CREAT SERPL-MCNC: 0.79 MG/DL (ref 0.55–1.02)
CREAT/UREA NIT SERPL: 18
EOSINOPHIL # BLD AUTO: 0.22 X10(3)/MCL (ref 0–0.9)
EOSINOPHIL NFR BLD AUTO: 4 %
ERYTHROCYTE [DISTWIDTH] IN BLOOD BY AUTOMATED COUNT: 13.1 % (ref 11.5–17)
EST. AVERAGE GLUCOSE BLD GHB EST-MCNC: 102.5 MG/DL
GFR SERPLBLD CREATININE-BSD FMLA CKD-EPI: >60 ML/MIN/1.73/M2
GLOBULIN SER-MCNC: 3.3 GM/DL (ref 2.4–3.5)
GLUCOSE SERPL-MCNC: 94 MG/DL (ref 74–100)
HBA1C MFR BLD: 5.2 %
HCT VFR BLD AUTO: 39.9 % (ref 37–47)
HDLC SERPL-MCNC: 60 MG/DL (ref 35–60)
HGB BLD-MCNC: 13.2 G/DL (ref 12–16)
IMM GRANULOCYTES # BLD AUTO: 0.01 X10(3)/MCL (ref 0–0.04)
IMM GRANULOCYTES NFR BLD AUTO: 0.2 %
LDLC SERPL CALC-MCNC: 120 MG/DL (ref 50–140)
LYMPHOCYTES # BLD AUTO: 2.01 X10(3)/MCL (ref 0.6–4.6)
LYMPHOCYTES NFR BLD AUTO: 36.9 %
MCH RBC QN AUTO: 32.3 PG (ref 27–31)
MCHC RBC AUTO-ENTMCNC: 33.1 G/DL (ref 33–36)
MCV RBC AUTO: 97.6 FL (ref 80–94)
MONOCYTES # BLD AUTO: 0.52 X10(3)/MCL (ref 0.1–1.3)
MONOCYTES NFR BLD AUTO: 9.6 %
NEUTROPHILS # BLD AUTO: 2.63 X10(3)/MCL (ref 2.1–9.2)
NEUTROPHILS NFR BLD AUTO: 48.4 %
NRBC BLD AUTO-RTO: 0 %
PLATELET # BLD AUTO: 230 X10(3)/MCL (ref 130–400)
PMV BLD AUTO: 11.4 FL (ref 7.4–10.4)
POTASSIUM SERPL-SCNC: 4.1 MMOL/L (ref 3.5–5.1)
PROT SERPL-MCNC: 7.1 GM/DL (ref 6.4–8.3)
RBC # BLD AUTO: 4.09 X10(6)/MCL (ref 4.2–5.4)
SODIUM SERPL-SCNC: 139 MMOL/L (ref 136–145)
TRIGL SERPL-MCNC: 62 MG/DL (ref 37–140)
TSH SERPL-ACNC: 1.46 UIU/ML (ref 0.35–4.94)
VLDLC SERPL CALC-MCNC: 12 MG/DL
WBC # BLD AUTO: 5.44 X10(3)/MCL (ref 4.5–11.5)

## 2025-04-16 PROCEDURE — 80053 COMPREHEN METABOLIC PANEL: CPT

## 2025-04-16 PROCEDURE — 83036 HEMOGLOBIN GLYCOSYLATED A1C: CPT

## 2025-04-16 PROCEDURE — 36415 COLL VENOUS BLD VENIPUNCTURE: CPT

## 2025-04-16 PROCEDURE — 85025 COMPLETE CBC W/AUTO DIFF WBC: CPT

## 2025-04-16 PROCEDURE — 84443 ASSAY THYROID STIM HORMONE: CPT

## 2025-04-16 PROCEDURE — 80061 LIPID PANEL: CPT

## 2025-04-25 ENCOUNTER — OFFICE VISIT (OUTPATIENT)
Dept: FAMILY MEDICINE | Facility: CLINIC | Age: 50
End: 2025-04-25
Payer: COMMERCIAL

## 2025-04-25 ENCOUNTER — TELEPHONE (OUTPATIENT)
Dept: FAMILY MEDICINE | Facility: CLINIC | Age: 50
End: 2025-04-25

## 2025-04-25 VITALS
HEART RATE: 52 BPM | SYSTOLIC BLOOD PRESSURE: 102 MMHG | BODY MASS INDEX: 21.92 KG/M2 | DIASTOLIC BLOOD PRESSURE: 68 MMHG | HEIGHT: 67 IN | WEIGHT: 139.69 LBS | RESPIRATION RATE: 16 BRPM | OXYGEN SATURATION: 98 % | TEMPERATURE: 98 F

## 2025-04-25 DIAGNOSIS — Z78.0 POSTMENOPAUSAL: ICD-10-CM

## 2025-04-25 DIAGNOSIS — F51.01 PRIMARY INSOMNIA: ICD-10-CM

## 2025-04-25 DIAGNOSIS — H65.193 ACUTE MUCOID OTITIS MEDIA OF BOTH EARS: ICD-10-CM

## 2025-04-25 DIAGNOSIS — Z12.31 BREAST CANCER SCREENING BY MAMMOGRAM: ICD-10-CM

## 2025-04-25 DIAGNOSIS — Z00.00 WELLNESS EXAMINATION: Primary | ICD-10-CM

## 2025-04-25 DIAGNOSIS — Z79.890 HORMONE REPLACEMENT THERAPY: ICD-10-CM

## 2025-04-25 RX ORDER — ESTRADIOL 2 MG/1
2 TABLET ORAL
COMMUNITY
Start: 2025-04-08

## 2025-04-25 RX ORDER — CIPROFLOXACIN AND DEXAMETHASONE 3; 1 MG/ML; MG/ML
4 SUSPENSION/ DROPS AURICULAR (OTIC) 2 TIMES DAILY
Qty: 7.5 ML | Refills: 1 | Status: SHIPPED | OUTPATIENT
Start: 2025-04-25

## 2025-04-25 RX ORDER — ESTRADIOL 1 MG/1
1 TABLET ORAL
COMMUNITY
Start: 2025-02-03 | End: 2025-04-25

## 2025-04-25 RX ORDER — TRAZODONE HYDROCHLORIDE 50 MG/1
50 TABLET ORAL NIGHTLY
Qty: 30 TABLET | Refills: 11 | Status: SHIPPED | OUTPATIENT
Start: 2025-04-25 | End: 2026-04-25

## 2025-04-25 RX ORDER — CIPROFLOXACIN 500 MG/1
500 TABLET ORAL 2 TIMES DAILY
Qty: 14 TABLET | Refills: 0 | Status: SHIPPED | OUTPATIENT
Start: 2025-04-25 | End: 2025-05-02

## 2025-04-25 NOTE — ASSESSMENT & PLAN NOTE
RX for trazodone, educated on medication  Contact clinic for concerns  Patient agreeable to plan and verbalizes understanding.

## 2025-04-25 NOTE — ASSESSMENT & PLAN NOTE
Sent ciprodex per patient request. She states she was on oral antibiotics in January and would like to hold off for now, will contact clinic if no improvement/worsening.

## 2025-04-25 NOTE — TELEPHONE ENCOUNTER
Contacted pt. Informed that Rx for oral Cipro was sent to her pharmacy. Mentioned to patient that oral regimens for ear infections are typically amoxicillin, cefdinir, or even augmentin. Stated that ENT specialist prescribed Cipro in the past and that antibiotic works well for her. Other antibiotics such as amoxicillin and augmentin upsets her stomach.

## 2025-04-25 NOTE — ASSESSMENT & PLAN NOTE
Labs done, reviewed with patient  She had mmg in 2/2025, repeat ordered with DEXA.   She had colonoscopy in 2024, will request records, recommended repeat in 10 years.   Declines vaccines today  Paps per GYN.    DISPLAY PLAN FREE TEXT DISPLAY PLAN FREE TEXT DISPLAY PLAN FREE TEXT DISPLAY PLAN FREE TEXT DISPLAY PLAN FREE TEXT DISPLAY PLAN FREE TEXT DISPLAY PLAN FREE TEXT DISPLAY PLAN FREE TEXT DISPLAY PLAN FREE TEXT DISPLAY PLAN FREE TEXT DISPLAY PLAN FREE TEXT DISPLAY PLAN FREE TEXT DISPLAY PLAN FREE TEXT DISPLAY PLAN FREE TEXT DISPLAY PLAN FREE TEXT

## 2025-04-25 NOTE — PROGRESS NOTES
Family Medicine  Deb Foreman, FNP-C    Patient ID: [id number]     Chief Complaint: Follow-up (Wellness/Ear infection-drainage )      HPI:     Patient presents to clinic unaccompanied for her wellness exam. She had labs done prior to her visit and those were reviewed with her. She states she is having ear pain and ear drainage from the right ear. She also states she has been having cramping in her feet, mostly at night, she is on her feet all day, works as a teacher. She would like to have her magnesium level checked. She wears tennis shoes, drinks plenty of water.     She also states for about one month she has been having trouble sleeping. She is wondering if it is her estrogen. She sees Dr. Hernandez. She states it is trouble staying asleep, she will wake up around 2 or 3 am and can't go back to sleep for about 2 hours. She is active during the day so she states she knows she is tired. She is interested in trying something for sleep.     Patient reports struggling with going thru menopause sx  She is now seen by GYN Dr. Hernandez  Reports that she is now on oral progesterone, oral estrogen and vaginal insert, she has increased her oral estrogen recently.   She reports she still has a good bit of agitation, not really any more hot flashes.      chronic medical history:  endometriosis  yeast infection: Boric Acid; Dr. 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, [employer]  exercise is 4 times per week  healthy diet       Family history:  Mom: HTN, HLD  Dad: HTN  Siblings: 2 brothers: HLD, Type 2 DM  Dad's Sister: Colon Cancer     Mammogram: 1/2023 [facility] neg for malignancy  Ordered 01/2024  Cologuard 12/29/2022 negative  PAP smear: completed 2021        Past Medical History:   Diagnosis Date    Hypercholesterolemia 12/05/2022    Other seasonal allergic rhinitis      Post-menopausal bleeding         Past Surgical History:   Procedure Laterality Date    BIOPSY OF PERITONEUM  9/18/2024    Procedure: BIOPSY, PERITONEUM;  Surgeon: Jr Chuck Hernandez MD;  Location: [facility] OR;  Service: OB/GYN;;    DIAGNOSTIC LAPAROSCOPY N/A 9/18/2024    Procedure: LAPAROSCOPY, DIAGNOSTIC, pelvic wash;  Surgeon: Jr Chuck Hernandez MD;  Location: [facility] OR;  Service: OB/GYN;  Laterality: N/A;    HYSTEROSCOPY WITH DILATION AND CURETTAGE OF UTERUS N/A 9/18/2024    Procedure: HYSTEROSCOPY, WITH DILATION AND CURETTAGE OF UTERUS;  Surgeon: Jr Chuck Hernandez MD;  Location: [facility] OR;  Service: OB/GYN;  Laterality: N/A;    TYMPANOPLASTY Left     March 2022        Social History     Tobacco Use    Smoking status: Never    Smokeless tobacco: Never   Substance and Sexual Activity    Alcohol use: Yes     Comment: once a month    Drug use: Never    Sexual activity: Yes     Partners: Male        Current Outpatient Medications   Medication Instructions    azelastine (ASTELIN) 137 mcg (0.1 %) nasal spray As needed (PRN)    ciprofloxacin-dexAMETHasone 0.3-0.1% (CIPRODEX) 0.3-0.1 % DrpS 4 drops, Both Ears, 2 times daily    clobetasoL (TEMOVATE) 0.05 % cream SMARTSIG:sparingly Topical Twice a Week    estradioL (ESTRACE) 2 mg    fluticasone propionate (FLONASE) 100 mcg, Each Nostril, Daily    progesterone (PROMETRIUM) 200 mg, Nightly    traZODone (DESYREL) 50 mg, Oral, Nightly    YUVAFEM 10 mcg Tab 1 tablet, Twice weekly       Review of patient's allergies indicates:   Allergen Reactions    Amoxicillin      Other reaction(s): sever yeast infection    Amoxicillin-pot clavulanate      Other reaction(s): Bacterial vaginal infection......., Vaginal yeast infection.......    Cefdinir         Patient Care Team:  Catherine Murrya MD as PCP - General (Internal Medicine)     Subjective:     Review of Systems   Constitutional: Negative.    HENT: Negative.     Eyes: Negative.    Respiratory: Negative.     Cardiovascular:  "Negative.    Gastrointestinal: Negative.    Endocrine: Negative.    Genitourinary: Negative.    Musculoskeletal: Negative.  Negative for myalgias.   Skin: Negative.    Allergic/Immunologic: Negative.    Neurological: Negative.    Hematological: Negative.    Psychiatric/Behavioral:  Positive for agitation and sleep disturbance.            Objective:     Visit Vitals  /68 (BP Location: Left arm, Patient Position: Sitting)   Pulse (!) 52   Temp 97.5 °F (36.4 °C) (Oral)   Resp 16   Ht 5' 7" (1.702 m)   Wt 63.4 kg (139 lb 11.2 oz)   SpO2 98%   BMI 21.88 kg/m²       Physical Exam  Vitals and nursing note reviewed.   Constitutional:       General: She is not in acute distress.     Appearance: Normal appearance. She is not ill-appearing.   HENT:      Head: Normocephalic and atraumatic.      Right Ear: Drainage present.      Left Ear: A middle ear effusion is present.      Mouth/Throat:      Mouth: Mucous membranes are moist.   Eyes:      Pupils: Pupils are equal, round, and reactive to light.   Cardiovascular:      Rate and Rhythm: Normal rate and regular rhythm.      Heart sounds: No murmur heard.     No friction rub. No gallop.   Pulmonary:      Effort: Pulmonary effort is normal. No respiratory distress.      Breath sounds: Normal breath sounds. No wheezing, rhonchi or rales.   Abdominal:      General: Abdomen is flat.      Palpations: Abdomen is soft.   Skin:     General: Skin is warm and dry.   Neurological:      General: No focal deficit present.      Mental Status: She is alert and oriented to person, place, and time.   Psychiatric:         Mood and Affect: Mood normal.         Behavior: Behavior normal.         Labs Reviewed:     Chemistry:  Lab Results   Component Value Date     04/16/2025    K 4.1 04/16/2025    BUN 14.4 04/16/2025    CREATININE 0.79 04/16/2025    EGFRNORACEVR >60 04/16/2025    GLUCOSE 94 04/16/2025    CALCIUM 9.0 04/16/2025    ALKPHOS 39 (L) 04/16/2025    LABPROT 7.1 04/16/2025    " ALBUMIN 3.8 04/16/2025    BILIDIR 0.2 11/29/2021    IBILI 0.50 11/29/2021    AST 24 04/16/2025    ALT 15 04/16/2025    TSH 1.459 04/16/2025        Lab Results   Component Value Date    HGBA1C 5.2 04/16/2025        Hematology:  Lab Results   Component Value Date    WBC 5.44 04/16/2025    HGB 13.2 04/16/2025    HCT 39.9 04/16/2025     04/16/2025       Lipid Panel:  Lab Results   Component Value Date    CHOL 192 04/16/2025    HDL 60 04/16/2025    .00 04/16/2025    TRIG 62 04/16/2025    TOTALCHOLEST 3 04/16/2025        Urine:  Lab Results   Component Value Date    APPEARANCEUA Hazy (A) 04/16/2025    SGUA 1.015 04/16/2025    PROTEINUA Negative 04/16/2025    KETONESUA Negative 04/16/2025    LEUKOCYTESUR Negative 04/16/2025    RBCUA 0-2 04/16/2025    WBCUA 0-2 04/16/2025    BACTERIA Few (A) 04/16/2025        Assessment:       ICD-10-CM ICD-9-CM   1. Wellness examination  Z00.00 V70.0   2. Primary insomnia  F51.01 307.42   3. Hormone replacement therapy  Z79.890 V07.4   4. Acute mucoid otitis media of both ears  H65.193 381.02   5. Breast cancer screening by mammogram  Z12.31 V76.12   6. Postmenopausal  Z78.0 V49.81        Plan:     1. Wellness examination  Assessment & Plan:  Labs done, reviewed with patient  She had mmg in 2/2025, repeat ordered with DEXA.   She had colonoscopy in 2024, will request records, recommended repeat in 10 years.   Declines vaccines today  Paps per GYN.     Orders:  -     CBC Auto Differential; Future; Expected date: 04/25/2026  -     Comprehensive Metabolic Panel; Future; Expected date: 04/25/2026  -     Lipid Panel; Future; Expected date: 04/25/2026  -     TSH; Future; Expected date: 04/25/2026  -     Hemoglobin A1C; Future; Expected date: 04/25/2026  -     Urinalysis; Future; Expected date: 04/25/2026  -     Magnesium; Future; Expected date: 04/25/2026    2. Primary insomnia  Assessment & Plan:  RX for trazodone, educated on medication  Contact clinic for concerns  Patient  agreeable to plan and verbalizes understanding.     Orders:  -     traZODone (DESYREL) 50 MG tablet; Take 1 tablet (50 mg total) by mouth every evening.  Dispense: 30 tablet; Refill: 11    3. Hormone replacement therapy  Assessment & Plan:  Continue meds, keep f/u with GYN as scheduled.       4. Acute mucoid otitis media of both ears  Assessment & Plan:  Sent ciprodex per patient request. She states she was on oral antibiotics in January and would like to hold off for now, will contact clinic if no improvement/worsening.     Orders:  -     ciprofloxacin-dexAMETHasone 0.3-0.1% (CIPRODEX) 0.3-0.1 % DrpS; Place 4 drops into both ears 2 (two) times daily.  Dispense: 7.5 mL; Refill: 1    5. Breast cancer screening by mammogram  Assessment & Plan:  Labs done, reviewed with patient  She had mmg in 2/2025, repeat ordered with DEXA.   She had colonoscopy in 2024, will request records, recommended repeat in 10 years.   Declines vaccines today  Paps per GYN.     Orders:  -     Mammo Digital Screening Bilat w/ [place] (XPD); Future; Expected date: 02/13/2026    6. Postmenopausal  Assessment & Plan:  Dexa ordered to be completed with mmg next year at [facility]    Orders:  -     DXA Bone Density Axial Skeleton 1 or more sites; Future; Expected date: 02/13/2026         Follow up in about 1 year (around 4/25/2026) for wellness with labs. In addition to their scheduled follow up, the patient has also been instructed to follow up on as needed basis.     Future Appointments   Date Time Provider Department Center   4/27/2026 10:00 AM Catherine Murray MD [facility]

## 2025-05-08 ENCOUNTER — TELEPHONE (OUTPATIENT)
Dept: FAMILY MEDICINE | Facility: CLINIC | Age: 50
End: 2025-05-08
Payer: COMMERCIAL

## 2025-05-08 RX ORDER — LEVOFLOXACIN 500 MG/1
500 TABLET, FILM COATED ORAL DAILY
Qty: 5 TABLET | Refills: 0 | Status: SHIPPED | OUTPATIENT
Start: 2025-05-08

## 2025-05-17 ENCOUNTER — OFFICE VISIT (OUTPATIENT)
Dept: URGENT CARE | Facility: CLINIC | Age: 50
End: 2025-05-17
Payer: COMMERCIAL

## 2025-05-17 VITALS
WEIGHT: 140 LBS | OXYGEN SATURATION: 100 % | HEIGHT: 67 IN | BODY MASS INDEX: 21.97 KG/M2 | TEMPERATURE: 98 F | HEART RATE: 54 BPM | SYSTOLIC BLOOD PRESSURE: 121 MMHG | RESPIRATION RATE: 16 BRPM | DIASTOLIC BLOOD PRESSURE: 59 MMHG

## 2025-05-17 DIAGNOSIS — H60.90 OTITIS EXTERNA, UNSPECIFIED CHRONICITY, UNSPECIFIED LATERALITY, UNSPECIFIED TYPE: ICD-10-CM

## 2025-05-17 DIAGNOSIS — H92.01 RIGHT EAR PAIN: Primary | ICD-10-CM

## 2025-05-17 PROCEDURE — 99213 OFFICE O/P EST LOW 20 MIN: CPT | Mod: ,,, | Performed by: FAMILY MEDICINE

## 2025-05-17 RX ORDER — OFLOXACIN 3 MG/ML
5 SOLUTION AURICULAR (OTIC) DAILY
Qty: 5 ML | Refills: 0 | Status: SHIPPED | OUTPATIENT
Start: 2025-05-17

## 2025-05-17 RX ORDER — HYDROCORTISONE AND ACETIC ACID 20.75; 10.375 MG/ML; MG/ML
SOLUTION AURICULAR (OTIC)
COMMUNITY
Start: 2025-05-13

## 2025-05-17 NOTE — PROGRESS NOTES
Patient ID: Hazel Bee is a 49 y.o. female.  Chief Complaint: Ear Problem    HPI:   Patient presents here today for above reason.     Patient is a 49 y.o. female who presents to urgent care with complaints of right ear pain x the last several weeks. Patient states that she experienced a sharp pain in the ear this morning (expressing concern regarding using the ear drops if her ear drum may be perforated). Alleviating factors include Cipro ear drops, Levofloxacin (from PCP for ear infection/fungal infection) with moderate amount of relief. Patient denies any other symptoms at this time.     Past Medical History:  Past Medical History:   Diagnosis Date    Hypercholesterolemia 2022    Other seasonal allergic rhinitis     Post-menopausal bleeding      Past Surgical History:   Procedure Laterality Date    BIOPSY OF PERITONEUM  2024    Procedure: BIOPSY, PERITONEUM;  Surgeon: Jr Chuck Hernandez MD;  Location: [facility] OR;  Service: OB/GYN;;    DIAGNOSTIC LAPAROSCOPY N/A 2024    Procedure: LAPAROSCOPY, DIAGNOSTIC, pelvic wash;  Surgeon: Jr Chuck Hernandez MD;  Location: [facility] OR;  Service: OB/GYN;  Laterality: N/A;    HYSTEROSCOPY WITH DILATION AND CURETTAGE OF UTERUS N/A 2024    Procedure: HYSTEROSCOPY, WITH DILATION AND CURETTAGE OF UTERUS;  Surgeon: Jr Chuck Hernandez MD;  Location: [facility] OR;  Service: OB/GYN;  Laterality: N/A;    TYMPANOPLASTY Left     2022     Review of patient's allergies indicates:   Allergen Reactions    Amoxicillin      Other reaction(s): sever yeast infection    Amoxicillin-pot clavulanate      Other reaction(s): Bacterial vaginal infection......., Vaginal yeast infection.......    Cefdinir      Current Outpatient Medications   Medication Instructions    acetic acid-hydrocortisone (VOSOL-HC) otic solution SMARTSI Drop(s) Right Ear 3 Times Daily    azelastine (ASTELIN) 137 mcg (0.1 %) nasal spray As needed (PRN)    clobetasoL (TEMOVATE) 0.05 % cream  "SMARTSIG:sparingly Topical Twice a Week    estradioL (ESTRACE) 2 mg    fluticasone propionate (FLONASE) 100 mcg, Each Nostril, Daily    ofloxacin (FLOXIN) 0.3 % otic solution 5 drops, Right Ear, Daily    progesterone (PROMETRIUM) 200 mg, Nightly    traZODone (DESYREL) 50 mg, Oral, Nightly    YUVAFEM 10 mcg Tab 1 tablet, Twice weekly     Social History[1]    ROS:   Review of Systems  12 point review of systems conducted, negative except as stated in the history of present illness. See HPI for details.   Vitals/PE:   Visit Vitals  BP (!) 121/59   Pulse (!) 54   Temp 97.7 °F (36.5 °C)   Resp 16   Ht 5' 7" (1.702 m)   Wt 63.5 kg (140 lb)   SpO2 100%   BMI 21.93 kg/m²     Physical Exam  Vitals and nursing note reviewed.   Constitutional:       Appearance: Normal appearance.   HENT:      Right Ear: Decreased hearing noted. Tenderness present. There is mastoid tenderness.      Ears:     Cardiovascular:      Rate and Rhythm: Normal rate and regular rhythm.      Pulses: Normal pulses.      Heart sounds: Normal heart sounds.   Musculoskeletal:         General: Normal range of motion.   Skin:     General: Skin is warm.      Capillary Refill: Capillary refill takes less than 2 seconds.   Neurological:      General: No focal deficit present.      Mental Status: She is alert and oriented to person, place, and time.   Psychiatric:         Mood and Affect: Mood normal.         Results for orders placed or performed in visit on 04/16/25   Comprehensive Metabolic Panel    Collection Time: 04/16/25  7:03 AM   Result Value Ref Range    Sodium 139 136 - 145 mmol/L    Potassium 4.1 3.5 - 5.1 mmol/L    Chloride 107 98 - 107 mmol/L    CO2 27 22 - 29 mmol/L    Glucose 94 74 - 100 mg/dL    Blood Urea Nitrogen 14.4 7.0 - 18.7 mg/dL    Creatinine 0.79 0.55 - 1.02 mg/dL    Calcium 9.0 8.4 - 10.2 mg/dL    Protein Total 7.1 6.4 - 8.3 gm/dL    Albumin 3.8 3.5 - 5.0 g/dL    Globulin 3.3 2.4 - 3.5 gm/dL    Albumin/Globulin Ratio 1.2 1.1 - 2.0 ratio "    Bilirubin Total 0.6 <=1.5 mg/dL    ALP 39 (L) 40 - 150 unit/L    ALT 15 0 - 55 unit/L    AST 24 11 - 45 unit/L    eGFR >60 mL/min/1.73/m2    Anion Gap 5.0 mEq/L    BUN/Creatinine Ratio 18    Lipid Panel    Collection Time: 04/16/25  7:03 AM   Result Value Ref Range    Cholesterol Total 192 <=200 mg/dL    HDL Cholesterol 60 35 - 60 mg/dL    Triglyceride 62 37 - 140 mg/dL    Cholesterol/HDL Ratio 3 0 - 5    Very Low Density Lipoprotein 12     LDL Cholesterol 120.00 50.00 - 140.00 mg/dL   TSH    Collection Time: 04/16/25  7:03 AM   Result Value Ref Range    TSH 1.459 0.350 - 4.940 uIU/mL   Hemoglobin A1C    Collection Time: 04/16/25  7:03 AM   Result Value Ref Range    Hemoglobin A1c 5.2 <=7.0 %    Estimated Average Glucose 102.5 mg/dL   CBC with Differential    Collection Time: 04/16/25  7:03 AM   Result Value Ref Range    WBC 5.44 4.50 - 11.50 x10(3)/mcL    RBC 4.09 (L) 4.20 - 5.40 x10(6)/mcL    Hgb 13.2 12.0 - 16.0 g/dL    Hct 39.9 37.0 - 47.0 %    MCV 97.6 (H) 80.0 - 94.0 fL    MCH 32.3 (H) 27.0 - 31.0 pg    MCHC 33.1 33.0 - 36.0 g/dL    RDW 13.1 11.5 - 17.0 %    Platelet 230 130 - 400 x10(3)/mcL    MPV 11.4 (H) 7.4 - 10.4 fL    Neut % 48.4 %    Lymph % 36.9 %    Mono % 9.6 %    Eos % 4.0 %    Basophil % 0.9 %    Imm Grans % 0.2 %    Neut # 2.63 2.1 - 9.2 x10(3)/mcL    Lymph # 2.01 0.6 - 4.6 x10(3)/mcL    Mono # 0.52 0.1 - 1.3 x10(3)/mcL    Eos # 0.22 0 - 0.9 x10(3)/mcL    Baso # 0.05 <=0.2 x10(3)/mcL    Imm Gran # 0.01 0.00 - 0.04 x10(3)/mcL    NRBC% 0.0 %     Assessment/Plan:   Right ear pain  Pain only with manual manipulation.  Otitis externa, unspecified chronicity, unspecified laterality, unspecified type  No obvious rupture of the tympanic membrane.  Fungal versus bacterial otitis externa.  She does have medications prescribed by her ENT for fungal infection of the ear.  Experienced significant pain with these eardrops in 1 instance.    Can not rule out bacterial component.  We will send in ofloxacin.   Welcome to call on-call provider add ENT office for recommendations.  She will be given ofloxacin today for bacterial coverage.  ER precautions for new onset loss of hearing.         Education and counseling done face to face regarding medical conditions and plan. Contact office if new symptoms develop. Should any symptoms ever significantly worsen seek emergency medical attention/go to ER. Follow up at least yearly for wellness or sooner PRN. Nurse to call patient with any results. The patient is receptive, expresses understanding and is agreeable to plan. All questions have been answered.             [1]   Social History  Socioeconomic History    Marital status:    Tobacco Use    Smoking status: Never    Smokeless tobacco: Never   Substance and Sexual Activity    Alcohol use: Yes     Comment: once a month    Drug use: Never    Sexual activity: Yes     Partners: Male

## 2025-05-17 NOTE — PATIENT INSTRUCTIONS
Assessment/Plan:   Right ear pain  Pain only with manual manipulation.  Otitis externa, unspecified chronicity, unspecified laterality, unspecified type  No obvious rupture of the tympanic membrane.  Fungal versus bacterial otitis externa.  She does have medications prescribed by her ENT for fungal infection of the ear.  Experienced significant pain with these eardrops in 1 instance.    Can not rule out bacterial component.  We will send in ofloxacin.  Welcome to call on-call provider add ENT office for recommendations.  She will be given ofloxacin today for bacterial coverage.  ER precautions for new onset loss of hearing.         Education and counseling done face to face regarding medical conditions and plan. Contact office if new symptoms develop. Should any symptoms ever significantly worsen seek emergency medical attention/go to ER. Follow up at least yearly for wellness or sooner PRN. Nurse to call patient with any results. The patient is receptive, expresses understanding and is agreeable to plan. All questions have been answered.

## 2025-07-03 ENCOUNTER — TELEPHONE (OUTPATIENT)
Dept: FAMILY MEDICINE | Facility: CLINIC | Age: 50
End: 2025-07-03
Payer: COMMERCIAL

## 2025-07-03 NOTE — TELEPHONE ENCOUNTER
Copied from CRM #[id number]. Topic: Appointments - Appointment Scheduling  >> Jul 3, 2025 12:36 PM Lillie Martin wrote:  Who Called: Hazel Bee    Caller is requesting a sooner appointment. Caller declined first available appointment listed below. Caller will not accept being placed on the waitlist and is requesting a message be sent to doctor.    When is the first available appointment?Oct 6  Options offered (Virtual Visit, Urgent Care): N/A  Symptoms:back pain , dent in buttock      Preferred Method of Contact: Phone Call  Patient's Preferred Phone Number on File: [phone]   Best Call Back Number, if different:  Additional Information: pt stated she doesn't want to wait until October for an appt and she doesn't want to see the nurse practitioner .

## 2025-07-21 LAB — BMD RECOMMENDATION EXT: NORMAL

## 2025-07-22 ENCOUNTER — DOCUMENTATION ONLY (OUTPATIENT)
Dept: FAMILY MEDICINE | Facility: CLINIC | Age: 50
End: 2025-07-22
Payer: COMMERCIAL

## 2025-07-22 ENCOUNTER — RESULTS FOLLOW-UP (OUTPATIENT)
Dept: FAMILY MEDICINE | Facility: CLINIC | Age: 50
End: 2025-07-22
Payer: COMMERCIAL

## 2025-07-22 DIAGNOSIS — M54.42 CHRONIC BILATERAL LOW BACK PAIN WITH SCIATICA, SCIATICA LATERALITY UNSPECIFIED: ICD-10-CM

## 2025-07-22 DIAGNOSIS — M25.551 BILATERAL HIP PAIN: Primary | ICD-10-CM

## 2025-07-22 DIAGNOSIS — M54.41 CHRONIC BILATERAL LOW BACK PAIN WITH SCIATICA, SCIATICA LATERALITY UNSPECIFIED: ICD-10-CM

## 2025-07-22 DIAGNOSIS — M25.552 BILATERAL HIP PAIN: Primary | ICD-10-CM

## 2025-07-22 DIAGNOSIS — G89.29 CHRONIC BILATERAL LOW BACK PAIN WITH SCIATICA, SCIATICA LATERALITY UNSPECIFIED: ICD-10-CM

## 2025-07-23 NOTE — TELEPHONE ENCOUNTER
Betzy or Vida  Can you find out how much out of pocket magnesium lab test is for patient, if she is comfortable with paying for it, we can order it for her  thanks

## (undated) DEVICE — JELLY SURGILUBE LUBE TUBE 2OZ

## (undated) DEVICE — DRAPE UTILITY W/ TAPE 20X30IN

## (undated) DEVICE — NDL SYR 10ML 18X1.5 LL BLUNT

## (undated) DEVICE — MANIPULATOR CLEARVIEW UTER 9CM

## (undated) DEVICE — DRAPE MEDI-SLUSH 44X44IN

## (undated) DEVICE — SCISSOR CURVED ENDOPATH 5MM

## (undated) DEVICE — KIT GYN LAPAROSCOPY LAFAYETTE

## (undated) DEVICE — Device

## (undated) DEVICE — BAG SPEC RETRV ENDO 4X6IN DISP

## (undated) DEVICE — PAD CURITY MATERNITY PERI

## (undated) DEVICE — DEVICE MYOSURE REACH

## (undated) DEVICE — GLOVE SIGNATURE ESSNTL LTX 7.5

## (undated) DEVICE — TROCAR ENDOPATH XCEL 5X100MM

## (undated) DEVICE — TROCAR ENDOPATH XCEL 11MM 10CM

## (undated) DEVICE — TUBE SUCTION MEDI-VAC STERILE

## (undated) DEVICE — NDL HYPO REG 25G X 1 1/2

## (undated) DEVICE — SYS HYDRO-SURG IRR SMOKE EVAC

## (undated) DEVICE — COVER HANDLE LIGHT RIGID

## (undated) DEVICE — TRAY SKIN SCRUB WET PREMIUM

## (undated) DEVICE — ELECTRODE PATIENT RETURN DISP

## (undated) DEVICE — SEAL LENS SCOPE MYOSURE

## (undated) DEVICE — DRAPE FULL SHEET 70X100IN

## (undated) DEVICE — SPONGE GAUZE 16PLY 4X4

## (undated) DEVICE — CONTAINER SPECIMEN SCREW 4OZ

## (undated) DEVICE — SOL NORMAL USPCA 0.9%

## (undated) DEVICE — PAD PREP CUFFED NS 24X48IN

## (undated) DEVICE — SPONGE COTTON TRAY 4X4IN

## (undated) DEVICE — SUT MCRYL PLUS 4-0 PS2 27IN

## (undated) DEVICE — TRAY CATH FOL SIL DRN BAG 16FR

## (undated) DEVICE — GLOVE SIGNATURE ESSNTL LTX 8

## (undated) DEVICE — YANKAUER FLEX NO VENT REG CAP

## (undated) DEVICE — ADHESIVE DERMABOND ADVANCED

## (undated) DEVICE — DRAPE UND BUTT W/POUCH 40X44IN

## (undated) DEVICE — STRIP MEDI WND CLSR 1/2X4IN

## (undated) DEVICE — SOL IRRI STRL WATER 1000ML

## (undated) DEVICE — SUPPORT ULNA NERVE PROTECTOR

## (undated) DEVICE — CANNULA ENDOPATH XCEL 5X100MM

## (undated) DEVICE — TOWEL OR DISP STRL BLUE 4/PK